# Patient Record
Sex: FEMALE | Race: WHITE | NOT HISPANIC OR LATINO | Employment: OTHER | URBAN - NONMETROPOLITAN AREA
[De-identification: names, ages, dates, MRNs, and addresses within clinical notes are randomized per-mention and may not be internally consistent; named-entity substitution may affect disease eponyms.]

---

## 2022-05-23 ENCOUNTER — OFFICE VISIT (OUTPATIENT)
Dept: FAMILY MEDICINE CLINIC | Facility: CLINIC | Age: 72
End: 2022-05-23

## 2022-05-23 VITALS
HEIGHT: 64 IN | DIASTOLIC BLOOD PRESSURE: 80 MMHG | BODY MASS INDEX: 25.1 KG/M2 | SYSTOLIC BLOOD PRESSURE: 144 MMHG | WEIGHT: 147 LBS | OXYGEN SATURATION: 98 % | HEART RATE: 83 BPM

## 2022-05-23 DIAGNOSIS — R03.0 ELEVATED BLOOD PRESSURE READING: ICD-10-CM

## 2022-05-23 DIAGNOSIS — E03.9 ACQUIRED HYPOTHYROIDISM: Primary | ICD-10-CM

## 2022-05-23 PROCEDURE — 36415 COLL VENOUS BLD VENIPUNCTURE: CPT | Performed by: PHYSICIAN ASSISTANT

## 2022-05-23 RX ORDER — LEVOTHYROXINE SODIUM 100 MCG
1 TABLET ORAL DAILY
COMMUNITY
Start: 2022-02-25 | End: 2022-05-23 | Stop reason: SDUPTHER

## 2022-05-23 RX ORDER — LEVOTHYROXINE SODIUM 0.1 MG/1
TABLET ORAL
Qty: 90 TABLET | Refills: 1 | Status: SHIPPED | OUTPATIENT
Start: 2022-05-23 | End: 2022-11-01 | Stop reason: SDUPTHER

## 2022-05-23 NOTE — ASSESSMENT & PLAN NOTE
Patient denies any missed doses we will check level today.  Switch patient to levothyroxine rather than Synthroid as requested today.

## 2022-05-23 NOTE — ASSESSMENT & PLAN NOTE
Patient reports normal blood pressure readings at home states she checks it almost daily and it remains below 120/80.

## 2022-05-23 NOTE — PROGRESS NOTES
"Chief Complaint  Hypothyroidism    Subjective          Latia Liz presents to [facility]  Patient reports today for medication refills.  States she has hypothyroidism and has been taking name brand Synthroid for about 6 months.  States that the medication is expensive and she does not notice a difference from levothyroxine.  She would like to switch back.  Patient denies any missed doses.    Patient reports she is due for health maintenance however declines at this time secondary to taking care of a family member.  Discussed risk associated with postponing and she acknowledged understanding      Objective   Vital Signs:  /80 (BP Location: Left arm, Patient Position: Sitting, Cuff Size: Adult)   Pulse 83   Ht 162.6 cm (64\")   Wt 66.7 kg (147 lb)   SpO2 98%   BMI 25.23 kg/m²         Physical Exam  Vitals and nursing note reviewed.   Constitutional:       General: She is not in acute distress.     Appearance: Normal appearance.   HENT:      Head: Normocephalic.      Right Ear: Hearing normal.      Left Ear: Hearing normal.   Eyes:      Pupils: Pupils are equal, round, and reactive to light.   Cardiovascular:      Rate and Rhythm: Normal rate and regular rhythm.   Pulmonary:      Effort: Pulmonary effort is normal. No respiratory distress.   Skin:     General: Skin is warm and dry.   Neurological:      Mental Status: She is alert.   Psychiatric:         Mood and Affect: Mood normal.        Result Review :                 Assessment and Plan    Diagnoses and all orders for this visit:    1. Acquired hypothyroidism (Primary)  Assessment & Plan:  Patient denies any missed doses we will check level today.  Switch patient to levothyroxine rather than Synthroid as requested today.    Orders:  -     levothyroxine (Synthroid) 100 MCG tablet; Take 1 tab po daily for thyroid  Dispense: 90 tablet; Refill: 1  -     TSH; Future    2. Elevated blood pressure reading  Assessment & " Plan:  Patient reports normal blood pressure readings at home states she checks it almost daily and it remains below 120/80.               Follow Up   No follow-ups on file.  Patient was given instructions and counseling regarding her condition or for health maintenance advice. Please see specific information pulled into the AVS if appropriate.

## 2022-05-24 LAB — TSH SERPL DL<=0.005 MIU/L-ACNC: 2.57 UIU/ML (ref 0.45–4.5)

## 2022-05-27 NOTE — PROGRESS NOTES
Please call notify patient her lab results were okay normal thyroid so continue current treatment plan

## 2022-09-16 ENCOUNTER — TELEPHONE (OUTPATIENT)
Dept: FAMILY MEDICINE CLINIC | Facility: CLINIC | Age: 72
End: 2022-09-16

## 2022-09-16 DIAGNOSIS — E03.9 HYPOTHYROIDISM (ACQUIRED): Primary | ICD-10-CM

## 2022-09-16 DIAGNOSIS — R73.9 HYPERGLYCEMIA: ICD-10-CM

## 2022-09-16 NOTE — TELEPHONE ENCOUNTER
Patient is requesting lab orders to be entered into Epic.  She has an upcoming appt 11/01/2022.  Once orders are entered into the system, patient would like to be contacted to schedule her lab visit. Ph: [phone]

## 2022-09-16 NOTE — TELEPHONE ENCOUNTER
Order in chart for routine fasting blood work including thyroid studies.    Please notify patient the blood work order was put in the chart.    If she has additional issues going on that might require additional blood work this will not be done until she has her appointment.

## 2022-10-24 ENCOUNTER — LAB (OUTPATIENT)
Dept: FAMILY MEDICINE CLINIC | Facility: CLINIC | Age: 72
End: 2022-10-24

## 2022-10-24 DIAGNOSIS — E03.9 HYPOTHYROIDISM (ACQUIRED): ICD-10-CM

## 2022-10-24 DIAGNOSIS — R73.9 HYPERGLYCEMIA: ICD-10-CM

## 2022-10-24 PROCEDURE — 36415 COLL VENOUS BLD VENIPUNCTURE: CPT | Performed by: FAMILY MEDICINE

## 2022-10-25 LAB
ALBUMIN SERPL-MCNC: 4.6 G/DL (ref 3.8–4.8)
ALBUMIN/GLOB SERPL: 1.9 {RATIO} (ref 1.2–2.2)
ALP SERPL-CCNC: 87 IU/L (ref 44–121)
ALT SERPL-CCNC: 13 IU/L (ref 0–32)
AST SERPL-CCNC: 20 IU/L (ref 0–40)
BASOPHILS # BLD AUTO: 0.1 X10E3/UL (ref 0–0.2)
BASOPHILS NFR BLD AUTO: 1 %
BILIRUB SERPL-MCNC: 0.5 MG/DL (ref 0–1.2)
BUN SERPL-MCNC: 12 MG/DL (ref 8–27)
BUN/CREAT SERPL: 14 (ref 12–28)
CALCIUM SERPL-MCNC: 9.7 MG/DL (ref 8.7–10.3)
CHLORIDE SERPL-SCNC: 105 MMOL/L (ref 96–106)
CHOLEST SERPL-MCNC: 256 MG/DL (ref 100–199)
CO2 SERPL-SCNC: 22 MMOL/L (ref 20–29)
CREAT SERPL-MCNC: 0.87 MG/DL (ref 0.57–1)
EGFRCR SERPLBLD CKD-EPI 2021: 72 ML/MIN/1.73
EOSINOPHIL # BLD AUTO: 0 X10E3/UL (ref 0–0.4)
EOSINOPHIL NFR BLD AUTO: 0 %
ERYTHROCYTE [DISTWIDTH] IN BLOOD BY AUTOMATED COUNT: 13.3 % (ref 11.7–15.4)
GLOBULIN SER CALC-MCNC: 2.4 G/DL (ref 1.5–4.5)
GLUCOSE SERPL-MCNC: 98 MG/DL (ref 70–99)
HBA1C MFR BLD: 5.4 % (ref 4.8–5.6)
HCT VFR BLD AUTO: 48.8 % (ref 34–46.6)
HDLC SERPL-MCNC: 56 MG/DL
HGB BLD-MCNC: 16.9 G/DL (ref 11.1–15.9)
IMM GRANULOCYTES # BLD AUTO: 0 X10E3/UL (ref 0–0.1)
IMM GRANULOCYTES NFR BLD AUTO: 1 %
LDLC SERPL CALC-MCNC: 179 MG/DL (ref 0–99)
LYMPHOCYTES # BLD AUTO: 1.2 X10E3/UL (ref 0.7–3.1)
LYMPHOCYTES NFR BLD AUTO: 15 %
MCH RBC QN AUTO: 32 PG (ref 26.6–33)
MCHC RBC AUTO-ENTMCNC: 34.6 G/DL (ref 31.5–35.7)
MCV RBC AUTO: 92 FL (ref 79–97)
MONOCYTES # BLD AUTO: 0.4 X10E3/UL (ref 0.1–0.9)
MONOCYTES NFR BLD AUTO: 6 %
NEUTROPHILS # BLD AUTO: 5.9 X10E3/UL (ref 1.4–7)
NEUTROPHILS NFR BLD AUTO: 77 %
PLATELET # BLD AUTO: 169 X10E3/UL (ref 150–450)
POTASSIUM SERPL-SCNC: 4.5 MMOL/L (ref 3.5–5.2)
PROT SERPL-MCNC: 7 G/DL (ref 6–8.5)
RBC # BLD AUTO: 5.28 X10E6/UL (ref 3.77–5.28)
SODIUM SERPL-SCNC: 143 MMOL/L (ref 134–144)
T4 FREE SERPL-MCNC: 2.05 NG/DL (ref 0.82–1.77)
TRIGL SERPL-MCNC: 118 MG/DL (ref 0–149)
TSH SERPL DL<=0.005 MIU/L-ACNC: 2.98 UIU/ML (ref 0.45–4.5)
VLDLC SERPL CALC-MCNC: 21 MG/DL (ref 5–40)
WBC # BLD AUTO: 7.7 X10E3/UL (ref 3.4–10.8)

## 2022-11-01 ENCOUNTER — OFFICE VISIT (OUTPATIENT)
Dept: FAMILY MEDICINE CLINIC | Facility: CLINIC | Age: 72
End: 2022-11-01

## 2022-11-01 VITALS
OXYGEN SATURATION: 98 % | HEART RATE: 85 BPM | DIASTOLIC BLOOD PRESSURE: 82 MMHG | SYSTOLIC BLOOD PRESSURE: 124 MMHG | HEIGHT: 64 IN | BODY MASS INDEX: 26.46 KG/M2 | WEIGHT: 155 LBS

## 2022-11-01 DIAGNOSIS — Z12.2 ENCOUNTER FOR SCREENING FOR LUNG CANCER: ICD-10-CM

## 2022-11-01 DIAGNOSIS — Z09 ENCOUNTER FOR FOLLOW-UP EXAMINATION AFTER COMPLETED TREATMENT FOR CONDITIONS OTHER THAN MALIGNANT NEOPLASM: ICD-10-CM

## 2022-11-01 DIAGNOSIS — Z12.11 SCREENING FOR COLON CANCER: ICD-10-CM

## 2022-11-01 DIAGNOSIS — Z12.31 SCREENING MAMMOGRAM FOR BREAST CANCER: ICD-10-CM

## 2022-11-01 DIAGNOSIS — E78.2 HYPERLIPIDEMIA, MIXED: ICD-10-CM

## 2022-11-01 DIAGNOSIS — E03.9 ACQUIRED HYPOTHYROIDISM: ICD-10-CM

## 2022-11-01 DIAGNOSIS — Z13.820 SCREENING FOR OSTEOPOROSIS: ICD-10-CM

## 2022-11-01 DIAGNOSIS — D75.1 POLYCYTHEMIA: ICD-10-CM

## 2022-11-01 DIAGNOSIS — Z00.00 GENERAL MEDICAL EXAM: Primary | ICD-10-CM

## 2022-11-01 DIAGNOSIS — Z87.891 PERSONAL HISTORY OF TOBACCO USE: ICD-10-CM

## 2022-11-01 PROCEDURE — G0439 PPPS, SUBSEQ VISIT: HCPCS | Performed by: FAMILY MEDICINE

## 2022-11-01 PROCEDURE — 99397 PER PM REEVAL EST PAT 65+ YR: CPT | Performed by: FAMILY MEDICINE

## 2022-11-01 PROCEDURE — 1159F MED LIST DOCD IN RCRD: CPT | Performed by: FAMILY MEDICINE

## 2022-11-01 PROCEDURE — 96160 PT-FOCUSED HLTH RISK ASSMT: CPT | Performed by: FAMILY MEDICINE

## 2022-11-01 PROCEDURE — 1170F FXNL STATUS ASSESSED: CPT | Performed by: FAMILY MEDICINE

## 2022-11-01 RX ORDER — LEVOTHYROXINE SODIUM 0.1 MG/1
TABLET ORAL
Qty: 90 TABLET | Refills: 1 | Status: SHIPPED | OUTPATIENT
Start: 2022-11-01

## 2022-11-01 RX ORDER — ROSUVASTATIN CALCIUM 5 MG/1
5 TABLET, COATED ORAL DAILY
Qty: 90 TABLET | Refills: 1 | Status: SHIPPED | OUTPATIENT
Start: 2022-11-01

## 2022-11-01 NOTE — ASSESSMENT & PLAN NOTE
We will start low-dose Crestor.  She had a lot of side effects with other medications.  Recheck on fasting lipids and repeat thyroid studies.

## 2022-11-01 NOTE — PROGRESS NOTES
QUICK REFERENCE INFORMATION:  The ABCs of the Annual Wellness Visit    Subsequent Medicare Wellness Visit    @[email or web address]@    HEALTH RISK ASSESSMENT    1950    Recent Hospitalizations:  No hospitalization(s) within the last year..        Current Medical Providers:  Patient Care Team:  Osmin Mays MD as PCP - General (Family Medicine)        Smoking Status:  Social History     Tobacco Use   Smoking Status Heavy Smoker   • Packs/day: 2.00   • Years: 42.00   • Pack years: 84.00   • Types: Cigarettes   Smokeless Tobacco Never       Alcohol Consumption:  Social History     Substance and Sexual Activity   Alcohol Use Never       Depression Screen:   PHQ-2/PHQ-9 Depression Screening 11/1/2022   Little Interest or Pleasure in Doing Things 0-->not at all   Feeling Down, Depressed or Hopeless 0-->not at all   PHQ-9: Brief Depression Severity Measure Score 0       Health Habits and Functional and Cognitive Screening:  Functional & Cognitive Status 11/1/2022   Do you have difficulty preparing food and eating? No   Do you have difficulty bathing yourself, getting dressed or grooming yourself? No   Do you have difficulty using the toilet? No   Do you have difficulty moving around from place to place? No   Do you have trouble with steps or getting out of a bed or a chair? No   Current Diet Well Balanced Diet   Dental Exam Not up to date   Eye Exam Not up to date   Exercise (times per week) 5 times per week   Current Exercises Include Yard Work   Do you need help using the phone?  No   Are you deaf or do you have serious difficulty hearing?  No   Do you need help with transportation? No   Do you need help shopping? No   Do you need help preparing meals?  No   Do you need help with housework?  No   Do you need help with laundry? No   Do you need help taking your medications? No   Do you need help managing money? No   Do you ever drive or ride in a car without wearing a seat belt? No   Have you felt unusual stress, anger  or loneliness in the last month? No   Who do you live with? Spouse   If you need help, do you have trouble finding someone available to you? No   Have you been bothered in the last four weeks by sexual problems? No   Do you have difficulty concentrating, remembering or making decisions? No       Fall Risk Screen:  MIRNA Fall Risk Assessment was completed, and patient is at LOW risk for falls.Assessment completed on:11/1/2022    ACE III MINI        Does the patient have evidence of cognitive impairment? No    Aspirin use counseling: Does not need ASA (and currently is not on it)    Recent Lab Results:  CMP:  Lab Results   Component Value Date    BUN 12 10/24/2022    CREATININE 0.87 10/24/2022    BCR 14 10/24/2022     10/24/2022    K 4.5 10/24/2022    CO2 22 10/24/2022    CALCIUM 9.7 10/24/2022    PROTENTOTREF 7.0 10/24/2022    ALBUMIN 4.6 10/24/2022    LABGLOBREF 2.4 10/24/2022    LABIL2 1.9 10/24/2022    BILITOT 0.5 10/24/2022    ALKPHOS 87 10/24/2022    AST 20 10/24/2022    ALT 13 10/24/2022     HbA1c:  Lab Results   Component Value Date    HGBA1C 5.4 10/24/2022     Microalbumin:  No results found for: MICROALBUR, POCMALB, POCCREAT  Lipid Panel  Lab Results   Component Value Date    TRIG 118 10/24/2022    HDL 56 10/24/2022     (H) 10/24/2022    AST 20 10/24/2022    ALT 13 10/24/2022       Visual Acuity:  No results found.    Age-appropriate Screening Schedule:  Refer to the list below for future screening recommendations based on patient's age, sex and/or medical conditions. Orders for these recommended tests are listed in the plan section. The patient has been provided with a written plan.    Health Maintenance   Topic Date Due   • DXA SCAN  Never done   • MAMMOGRAM  11/16/2012   • LIPID PANEL  10/24/2023   • INFLUENZA VACCINE  Discontinued   • TDAP/TD VACCINES  Discontinued   • ZOSTER VACCINE  Discontinued        Subjective   History of Present Illness    Latia Liz is a 71 y.o. female who  presents for a Subsequent Wellness Visit.    She did have fasting blood work done and is here to review the results.    She continues to smoke and is planning to consider nicotine replacement to help her quit.  She did fail the patches in the past due to nightmares.  She is willing to get set up for low-dose lung CT.    Declines all vaccination updates-reviewed flu, Prevnar 20, Tdap, Shingrix, and COVID today.    Declines colonoscopy but is willing to try Cologuard again.    Past due for mammogram and DEXA and is willing to get these scheduled.    Blood pressure checks at home of been doing well but usually runs higher here.    Lipids did return significantly elevated with her last labs and she is willing to try the lowest dose on Crestor but had side effects with other medications.    Thyroid blood work returned with normal TSH but mild free T4 elevation.  We will continue her current dosing and get a recheck on thyroid studies.    Mild polycythemia with labs.  We discussed further work-up options but this is likely related to her smoking.  She voiced understanding but wants to wait on further work-up at this time.    CHRONIC CONDITIONS    The following portions of the patient's history were reviewed and updated as appropriate: allergies, current medications, past family history, past medical history, past social history, past surgical history and problem list.    Outpatient Medications Prior to Visit   Medication Sig Dispense Refill   • levothyroxine (Synthroid) 100 MCG tablet Take 1 tab po daily for thyroid 90 tablet 1     No facility-administered medications prior to visit.       Patient Active Problem List   Diagnosis   • Acquired hypothyroidism   • Elevated blood pressure reading   • General medical exam   • Screening mammogram for breast cancer   • Screening for osteoporosis   • Screening for colon cancer   • Personal history of tobacco use   • Encounter for screening for lung cancer   • Encounter for  follow-up examination after completed treatment for conditions other than malignant neoplasm   • Hyperlipidemia, mixed   • Polycythemia       Advance Care Planning:  ACP discussion was held with the patient during this visit. Patient does not have an advance directive, information provided.    Identification of Risk Factors:  Risk factors include: Advance Directive Discussion  Breast Cancer/Mammogram Screening  Colon Cancer Screening  Immunizations Discussed/Encouraged (specific immunizations; Tdap, Influenza, Prevnar 20 (Pneumococcal 20-valent conjugate), Shingrix and COVID19 )  Lung Cancer Risk  Osteoporosis Risk.    Review of Systems   Constitutional: Negative for appetite change, chills, fever and unexpected weight change.   HENT: Negative for hearing loss.    Eyes: Negative for visual disturbance.   Respiratory: Negative for chest tightness, shortness of breath and wheezing.    Cardiovascular: Negative for chest pain, palpitations and leg swelling.   Gastrointestinal: Negative for abdominal pain.   Musculoskeletal: Negative for arthralgias, back pain and gait problem.   Skin: Negative for rash.   Neurological: Negative for dizziness and headaches.   Psychiatric/Behavioral: Negative for agitation and confusion. The patient is not nervous/anxious.         Increase stressors at home with her 's medical problems.  He was recently diagnosed with A. fib       Compared to one year ago, the patient feels her physical health is the same.  Compared to one year ago, the patient feels her mental health is the same.    Objective     Physical Exam  Constitutional:       Appearance: Normal appearance.   HENT:      Head: Normocephalic.      Right Ear: External ear normal.      Left Ear: External ear normal.      Nose: Nose normal.   Eyes:      Pupils: Pupils are equal, round, and reactive to light.   Cardiovascular:      Rate and Rhythm: Normal rate and regular rhythm.      Heart sounds: Normal heart sounds.  "  Pulmonary:      Effort: Pulmonary effort is normal.      Breath sounds: Normal breath sounds.   Musculoskeletal:         General: Normal range of motion.      Cervical back: Normal range of motion.   Skin:     General: Skin is warm and dry.   Neurological:      General: No focal deficit present.      Mental Status: She is alert.   Psychiatric:         Mood and Affect: Mood normal.         Behavior: Behavior normal.         Thought Content: Thought content normal.          Procedures     Vitals:    11/01/22 1510 11/01/22 1520   BP: 140/90 124/82   BP Location: Left arm    Patient Position: Sitting    Cuff Size: Adult    Pulse: 85    SpO2: 98%    Weight: 70.3 kg (155 lb)    Height: 162.6 cm (64\")        BMI is >= 25 and <30. (Overweight) The following options were offered after discussion;: exercise counseling/recommendations and nutrition counseling/recommendations      Lab Results   Component Value Date    WBC 7.7 10/24/2022    HGB 16.9 (H) 10/24/2022    HCT 48.8 (H) 10/24/2022    MCV 92 10/24/2022     10/24/2022     Lab Results   Component Value Date    GLUCOSE 98 10/24/2022    BUN 12 10/24/2022    CREATININE 0.87 10/24/2022    BCR 14 10/24/2022    K 4.5 10/24/2022    CO2 22 10/24/2022    CALCIUM 9.7 10/24/2022    PROTENTOTREF 7.0 10/24/2022    ALBUMIN 4.6 10/24/2022    LABIL2 1.9 10/24/2022    AST 20 10/24/2022    ALT 13 10/24/2022     Lab Results   Component Value Date    TSH 2.980 10/24/2022     No results found for: PSA  Lab Results   Component Value Date    CHLPL 256 (H) 10/24/2022    TRIG 118 10/24/2022    HDL 56 10/24/2022     (H) 10/24/2022       Assessment & Plan   Problem List Items Addressed This Visit        Cardiac and Vasculature    Hyperlipidemia, mixed    Current Assessment & Plan     We will start low-dose Crestor.  She had a lot of side effects with other medications.  Recheck on fasting lipids and repeat thyroid studies.         Relevant Medications    rosuvastatin (Crestor) 5 MG " tablet       Endocrine and Metabolic    Acquired hypothyroidism    Relevant Medications    levothyroxine (Synthroid) 100 MCG tablet       Gastrointestinal Abdominal     Screening for colon cancer    Relevant Orders    Cologuard - Stool, Per Rectum       Health Encounters    General medical exam - Primary    Current Assessment & Plan     Reviewed together health maintenance, screening, and vaccination options at her annual wellness visit and physical today.    She voiced understanding and is being set up for mammogram, DEXA, Cologuard, and low-dose lung CT.    Declines all vaccination update.    Labs reviewed and we will start low-dose Crestor.    Smoking cessation advised discussed nicotine replacement options.         Encounter for follow-up examination after completed treatment for conditions other than malignant neoplasm    Relevant Orders    DEXA Bone Density Axial       Hematology and Neoplasia    Polycythemia    Current Assessment & Plan     See above.    Likely related to smoking.    Working on quitting smoking and we will monitor this with future labs.            Musculoskeletal and Injuries    Screening for osteoporosis    Relevant Orders    DEXA Bone Density Axial       Pulmonary and Pneumonias    Encounter for screening for lung cancer    Relevant Orders     CT Chest Low Dose Cancer Screening WO       Tobacco    Personal history of tobacco use    Relevant Orders     CT Chest Low Dose Cancer Screening WO       Other    Screening mammogram for breast cancer    Relevant Orders    Mammo Screening Bilateral With CAD     Patient Self-Management and Personalized Health Advice  The patient has been provided with information about: exercise and weight management and preventive services including:   · Annual Wellness Visit (AWV)  · Colorectal Cancer Screening, Cologuard Test   · Hepatitis C Virus Screening (beneficiaries must fall into one of the following categories to be eligible- high risk for HCV infection,  born between 8518-[birth year]256, or history of blood transfusion before 1992)  · Lung Cancer Screening Counseling and Annual Screening for Lung Cancer with Low Dose Computed Tomography (LDCT); (use of smartset for low dose CT for lung cancer screening for documentation and orders).    Outpatient Encounter Medications as of 11/1/2022   Medication Sig Dispense Refill   • levothyroxine (Synthroid) 100 MCG tablet Take 1 tab po daily for thyroid 90 tablet 1   • [DISCONTINUED] levothyroxine (Synthroid) 100 MCG tablet Take 1 tab po daily for thyroid 90 tablet 1   • rosuvastatin (Crestor) 5 MG tablet Take 1 tablet by mouth Daily. 90 tablet 1     No facility-administered encounter medications on file as of 11/1/2022.       Reviewed use of high risk medication in the elderly: yes  Reviewed for potential of harmful drug interactions in the elderly: yes    Diagnoses and all orders for this visit:    1. General medical exam (Primary)  Assessment & Plan:  Reviewed together health maintenance, screening, and vaccination options at her annual wellness visit and physical today.    She voiced understanding and is being set up for mammogram, DEXA, Cologuard, and low-dose lung CT.    Declines all vaccination update.    Labs reviewed and we will start low-dose Crestor.    Smoking cessation advised discussed nicotine replacement options.      2. Screening mammogram for breast cancer  -     Mammo Screening Bilateral With CAD; Future    3. Screening for osteoporosis  -     DEXA Bone Density Axial; Future    4. Screening for colon cancer  -     Cologuard - Stool, Per Rectum; Future    5. Personal history of tobacco use  -      CT Chest Low Dose Cancer Screening WO; Future    6. Encounter for screening for lung cancer  -      CT Chest Low Dose Cancer Screening WO; Future    7. Encounter for follow-up examination after completed treatment for conditions other than malignant neoplasm  -     DEXA Bone Density Axial; Future    8. Hyperlipidemia,  mixed  Assessment & Plan:  We will start low-dose Crestor.  She had a lot of side effects with other medications.  Recheck on fasting lipids and repeat thyroid studies.    Orders:  -     rosuvastatin (Crestor) 5 MG tablet; Take 1 tablet by mouth Daily.  Dispense: 90 tablet; Refill: 1    9. Acquired hypothyroidism  -     levothyroxine (Synthroid) 100 MCG tablet; Take 1 tab po daily for thyroid  Dispense: 90 tablet; Refill: 1    10. Polycythemia  Assessment & Plan:  See above.    Likely related to smoking.    Working on quitting smoking and we will monitor this with future labs.        Follow Up:  Return in about 6 months (around 5/1/2023) for Med recheck.     There are no Patient Instructions on file for this visit.    An After Visit Summary and PPPS with all of these plans were given to the patient.

## 2022-11-01 NOTE — ASSESSMENT & PLAN NOTE
See above.    Likely related to smoking.    Working on quitting smoking and we will monitor this with future labs.

## 2022-11-01 NOTE — ASSESSMENT & PLAN NOTE
Reviewed together health maintenance, screening, and vaccination options at her annual wellness visit and physical today.    She voiced understanding and is being set up for mammogram, DEXA, Cologuard, and low-dose lung CT.    Declines all vaccination update.    Labs reviewed and we will start low-dose Crestor.    Smoking cessation advised discussed nicotine replacement options.

## 2022-12-13 ENCOUNTER — APPOINTMENT (OUTPATIENT)
Dept: WOMENS IMAGING | Facility: HOSPITAL | Age: 72
End: 2022-12-13

## 2022-12-13 PROCEDURE — 77067 SCR MAMMO BI INCL CAD: CPT | Performed by: RADIOLOGY

## 2023-05-01 ENCOUNTER — OFFICE VISIT (OUTPATIENT)
Dept: FAMILY MEDICINE CLINIC | Facility: CLINIC | Age: 73
End: 2023-05-01
Payer: MEDICARE

## 2023-05-01 VITALS
SYSTOLIC BLOOD PRESSURE: 125 MMHG | HEART RATE: 89 BPM | BODY MASS INDEX: 26.98 KG/M2 | HEIGHT: 64 IN | WEIGHT: 158 LBS | DIASTOLIC BLOOD PRESSURE: 84 MMHG | OXYGEN SATURATION: 95 %

## 2023-05-01 DIAGNOSIS — Z11.59 NEED FOR HEPATITIS C SCREENING TEST: ICD-10-CM

## 2023-05-01 DIAGNOSIS — R03.0 ELEVATED BLOOD PRESSURE READING: Chronic | ICD-10-CM

## 2023-05-01 DIAGNOSIS — E03.9 ACQUIRED HYPOTHYROIDISM: Primary | ICD-10-CM

## 2023-05-01 DIAGNOSIS — E78.2 HYPERLIPIDEMIA, MIXED: ICD-10-CM

## 2023-05-01 DIAGNOSIS — J01.10 ACUTE NON-RECURRENT FRONTAL SINUSITIS: ICD-10-CM

## 2023-05-01 DIAGNOSIS — D75.1 POLYCYTHEMIA: ICD-10-CM

## 2023-05-01 DIAGNOSIS — M81.0 AGE-RELATED OSTEOPOROSIS WITHOUT CURRENT PATHOLOGICAL FRACTURE: ICD-10-CM

## 2023-05-01 PROBLEM — Z09 ENCOUNTER FOR FOLLOW-UP EXAMINATION AFTER COMPLETED TREATMENT FOR CONDITIONS OTHER THAN MALIGNANT NEOPLASM: Status: RESOLVED | Noted: 2022-11-01 | Resolved: 2023-05-01

## 2023-05-01 PROBLEM — Z13.820 SCREENING FOR OSTEOPOROSIS: Status: RESOLVED | Noted: 2022-11-01 | Resolved: 2023-05-01

## 2023-05-01 RX ORDER — LEVOTHYROXINE SODIUM 0.1 MG/1
TABLET ORAL
Qty: 90 TABLET | Refills: 1 | Status: SHIPPED | OUTPATIENT
Start: 2023-05-01 | End: 2023-05-02 | Stop reason: SDUPTHER

## 2023-05-01 RX ORDER — ROSUVASTATIN CALCIUM 5 MG/1
5 TABLET, COATED ORAL DAILY
Qty: 90 TABLET | Refills: 1 | Status: SHIPPED | OUTPATIENT
Start: 2023-05-01

## 2023-05-01 RX ORDER — CEFUROXIME AXETIL 500 MG/1
500 TABLET ORAL 2 TIMES DAILY
Qty: 20 TABLET | Refills: 0 | Status: SHIPPED | OUTPATIENT
Start: 2023-05-01 | End: 2023-05-11

## 2023-05-01 NOTE — ASSESSMENT & PLAN NOTE
Home checks have been good but they do tend to be a little higher here.  We will continue to monitor.  Smoking cessation advised.  No medication is needed at this time

## 2023-05-01 NOTE — ASSESSMENT & PLAN NOTE
Given duration of worsening symptoms and sinus tenderness on exam will treat with Ceftin for sinusitis.  Smoking cessation advised.  We also discussed and recommended ongoing Flonase use

## 2023-05-01 NOTE — PROGRESS NOTES
"Chief Complaint  Allergies and Med Refill    Subjective    History of Present Illness:  Latia Liz is a 72 y.o. female who presents today for 6-month follow-up visit medication refills.    She has been doing well since our last visit for Medicare annual wellness and physical exam in November.    She is having increased frontal sinus pain and pressure and this has worsened over the past few weeks.  She does understand that ongoing smoking does increase her risk for bacterial sinusitis.    Continues on Synthroid for hypothyroidism and Crestor for hyperlipidemia.    She continues to smoke and is planning to consider nicotine replacement to help her quit.  She did fail the patches in the past due to nightmares.  Low-dose lung CT up-to-date December 2022.     Declines all vaccination updates-reviewed flu, Prevnar 20, Tdap, Shingrix, and COVID today.     Declines colonoscopy and we did order Cologuard but she has not yet completed it.    Mammogram up-to-date December 2022.  Mammogram returned normal and DEXA did show osteoporosis.  She is willing to get vitamin D checked but does not want any other treatment at this time.    Mild polycythemia with past labs.  We discussed further work-up options but this is likely related to her smoking.  She voiced understanding but wants to wait on further work-up at this time.    Objective   Vital Signs:   /84   Pulse 89   Ht 162.6 cm (64\")   Wt 71.7 kg (158 lb)   SpO2 95%   BMI 27.12 kg/m²     Review of Systems   Constitutional: Negative for appetite change, chills and fever.   HENT: Positive for sinus pressure. Negative for hearing loss.         Worsening frontal sinus pain and pressure.  See HPI   Eyes: Negative for blurred vision.   Respiratory: Negative for chest tightness.    Cardiovascular: Negative for chest pain.   Gastrointestinal: Negative for abdominal pain.   Musculoskeletal: Negative for gait problem.   Skin: Negative for rash.   Psychiatric/Behavioral: " Negative for depressed mood.       Past History:  Medical History: has a past medical history of Hypercholesterolemia and Thyroiditis.   Surgical History: has no past surgical history on file.   Family History: family history is not on file.   Social History: reports that she has been smoking cigarettes. She has a 84.00 pack-year smoking history. She has never used smokeless tobacco. She reports that she does not drink alcohol and does not use drugs.      Current Outpatient Medications:   •  levothyroxine (Synthroid) 100 MCG tablet, Take 1 tab po daily for thyroid, Disp: 90 tablet, Rfl: 1  •  rosuvastatin (Crestor) 5 MG tablet, Take 1 tablet by mouth Daily., Disp: 90 tablet, Rfl: 1  •  cefuroxime (CEFTIN) 500 MG tablet, Take 1 tablet by mouth 2 (Two) Times a Day for 10 days., Disp: 20 tablet, Rfl: 0    Allergies: Patient has no known allergies.    Physical Exam  Constitutional:       Appearance: Normal appearance.   HENT:      Head: Normocephalic.      Comments: Frontal sinus tenderness     Right Ear: Tympanic membrane, ear canal and external ear normal.      Left Ear: Tympanic membrane, ear canal and external ear normal.      Nose: Nose normal.   Eyes:      Pupils: Pupils are equal, round, and reactive to light.   Cardiovascular:      Rate and Rhythm: Normal rate and regular rhythm.      Heart sounds: Normal heart sounds.   Pulmonary:      Effort: Pulmonary effort is normal.      Breath sounds: Normal breath sounds.   Musculoskeletal:         General: Normal range of motion.      Cervical back: Normal range of motion.   Skin:     General: Skin is warm and dry.   Neurological:      General: No focal deficit present.      Mental Status: She is alert.   Psychiatric:         Mood and Affect: Mood normal.         Behavior: Behavior normal.         Thought Content: Thought content normal.          Result Review                   Assessment and Plan  Diagnoses and all orders for this visit:    1. Acquired hypothyroidism  (Primary)  Assessment & Plan:  Continue Synthroid    Orders:  -     levothyroxine (Synthroid) 100 MCG tablet; Take 1 tab po daily for thyroid  Dispense: 90 tablet; Refill: 1  -     TSH; Future  -     T4, Free; Future  -     TSH  -     T4, Free    2. Hyperlipidemia, mixed  Assessment & Plan:  Discussed Crestor and she has had some side effects so has not been taking this regularly but will try and adding co-Q10 to reduce side effect risk with low-dose Crestor.    Smoking cessation also encouraged.    Orders:  -     rosuvastatin (Crestor) 5 MG tablet; Take 1 tablet by mouth Daily.  Dispense: 90 tablet; Refill: 1  -     Comprehensive Metabolic Panel; Future  -     Lipid Panel; Future  -     TSH; Future  -     T4, Free; Future  -     Comprehensive Metabolic Panel  -     Lipid Panel  -     TSH  -     T4, Free    3. Polycythemia  Assessment & Plan:  Awaiting recheck on CBC.  Discussed concerns with polycythemia and suspect this is related to secondary polycythemia due to her ongoing smoking.  She would like to hold off on further work-up other than recheck on CBC    Orders:  -     CBC Auto Differential; Future  -     CBC Auto Differential    4. Age-related osteoporosis without current pathological fracture  Assessment & Plan:  Discussed osteoporosis and osteopenia with her most recent bone density evaluation in December.  She voiced understanding but declines medications for osteoporosis.  She is willing to get a check on her vitamin D with labs today.    Orders:  -     Vitamin D,25-Hydroxy; Future  -     Vitamin D,25-Hydroxy    5. Need for hepatitis C screening test  -     HCV Antibody Rfx To Qnt PCR; Future  -     HCV Antibody Rfx To Qnt PCR    6. Elevated blood pressure reading  Assessment & Plan:  Home checks have been good but they do tend to be a little higher here.  We will continue to monitor.  Smoking cessation advised.  No medication is needed at this time      7. Acute non-recurrent frontal  sinusitis  Assessment & Plan:  Given duration of worsening symptoms and sinus tenderness on exam will treat with Ceftin for sinusitis.  Smoking cessation advised.  We also discussed and recommended ongoing Flonase use    Orders:  -     cefuroxime (CEFTIN) 500 MG tablet; Take 1 tablet by mouth 2 (Two) Times a Day for 10 days.  Dispense: 20 tablet; Refill: 0      BMI is >= 25 and <30. (Overweight) The following options were offered after discussion;: exercise counseling/recommendations and nutrition counseling/recommendations          Follow Up  Return in 6 months (on 11/2/2023) for Medicare Wellness, Annual physical, Fasting for labs at appointment (but drink water!).    Osmin Mays MD

## 2023-05-01 NOTE — ASSESSMENT & PLAN NOTE
Discussed osteoporosis and osteopenia with her most recent bone density evaluation in December.  She voiced understanding but declines medications for osteoporosis.  She is willing to get a check on her vitamin D with labs today.

## 2023-05-01 NOTE — ASSESSMENT & PLAN NOTE
Awaiting recheck on CBC.  Discussed concerns with polycythemia and suspect this is related to secondary polycythemia due to her ongoing smoking.  She would like to hold off on further work-up other than recheck on CBC

## 2023-05-01 NOTE — ASSESSMENT & PLAN NOTE
Discussed Crestor and she has had some side effects so has not been taking this regularly but will try and adding co-Q10 to reduce side effect risk with low-dose Crestor.    Smoking cessation also encouraged.

## 2023-05-02 DIAGNOSIS — M81.0 AGE-RELATED OSTEOPOROSIS WITHOUT CURRENT PATHOLOGICAL FRACTURE: Primary | ICD-10-CM

## 2023-05-02 DIAGNOSIS — E55.9 VITAMIN D DEFICIENCY: ICD-10-CM

## 2023-05-02 DIAGNOSIS — E03.9 ACQUIRED HYPOTHYROIDISM: ICD-10-CM

## 2023-05-02 LAB
25(OH)D3+25(OH)D2 SERPL-MCNC: 22.6 NG/ML (ref 30–100)
ALBUMIN SERPL-MCNC: 4.8 G/DL (ref 3.8–4.8)
ALBUMIN/GLOB SERPL: 2.1 {RATIO} (ref 1.2–2.2)
ALP SERPL-CCNC: 84 IU/L (ref 44–121)
ALT SERPL-CCNC: 21 IU/L (ref 0–32)
AST SERPL-CCNC: 26 IU/L (ref 0–40)
BASOPHILS # BLD AUTO: 0.1 X10E3/UL (ref 0–0.2)
BASOPHILS NFR BLD AUTO: 2 %
BILIRUB SERPL-MCNC: 0.3 MG/DL (ref 0–1.2)
BUN SERPL-MCNC: 10 MG/DL (ref 8–27)
BUN/CREAT SERPL: 11 (ref 12–28)
CALCIUM SERPL-MCNC: 9.6 MG/DL (ref 8.7–10.3)
CHLORIDE SERPL-SCNC: 107 MMOL/L (ref 96–106)
CHOLEST SERPL-MCNC: 192 MG/DL (ref 100–199)
CO2 SERPL-SCNC: 22 MMOL/L (ref 20–29)
CREAT SERPL-MCNC: 0.88 MG/DL (ref 0.57–1)
EGFRCR SERPLBLD CKD-EPI 2021: 71 ML/MIN/1.73
EOSINOPHIL # BLD AUTO: 0 X10E3/UL (ref 0–0.4)
EOSINOPHIL NFR BLD AUTO: 0 %
ERYTHROCYTE [DISTWIDTH] IN BLOOD BY AUTOMATED COUNT: 13.5 % (ref 11.7–15.4)
GLOBULIN SER CALC-MCNC: 2.3 G/DL (ref 1.5–4.5)
GLUCOSE SERPL-MCNC: 106 MG/DL (ref 70–99)
HCT VFR BLD AUTO: 47.7 % (ref 34–46.6)
HCV AB SERPL QL IA: NORMAL
HCV IGG SERPL QL IA: NON REACTIVE
HDLC SERPL-MCNC: 59 MG/DL
HGB BLD-MCNC: 16.3 G/DL (ref 11.1–15.9)
IMM GRANULOCYTES # BLD AUTO: 0 X10E3/UL (ref 0–0.1)
IMM GRANULOCYTES NFR BLD AUTO: 0 %
LDLC SERPL CALC-MCNC: 118 MG/DL (ref 0–99)
LYMPHOCYTES # BLD AUTO: 1.6 X10E3/UL (ref 0.7–3.1)
LYMPHOCYTES NFR BLD AUTO: 22 %
MCH RBC QN AUTO: 30.8 PG (ref 26.6–33)
MCHC RBC AUTO-ENTMCNC: 34.2 G/DL (ref 31.5–35.7)
MCV RBC AUTO: 90 FL (ref 79–97)
MONOCYTES # BLD AUTO: 0.5 X10E3/UL (ref 0.1–0.9)
MONOCYTES NFR BLD AUTO: 8 %
NEUTROPHILS # BLD AUTO: 4.8 X10E3/UL (ref 1.4–7)
NEUTROPHILS NFR BLD AUTO: 68 %
PLATELET # BLD AUTO: 220 X10E3/UL (ref 150–450)
POTASSIUM SERPL-SCNC: 4.6 MMOL/L (ref 3.5–5.2)
PROT SERPL-MCNC: 7.1 G/DL (ref 6–8.5)
RBC # BLD AUTO: 5.3 X10E6/UL (ref 3.77–5.28)
SODIUM SERPL-SCNC: 144 MMOL/L (ref 134–144)
T4 FREE SERPL-MCNC: 2.17 NG/DL (ref 0.82–1.77)
TRIGL SERPL-MCNC: 81 MG/DL (ref 0–149)
TSH SERPL DL<=0.005 MIU/L-ACNC: 2.14 UIU/ML (ref 0.45–4.5)
VLDLC SERPL CALC-MCNC: 15 MG/DL (ref 5–40)
WBC # BLD AUTO: 7.1 X10E3/UL (ref 3.4–10.8)

## 2023-05-02 RX ORDER — LEVOTHYROXINE SODIUM 88 UG/1
88 TABLET ORAL
Qty: 90 TABLET | Refills: 1 | Status: SHIPPED | OUTPATIENT
Start: 2023-05-02

## 2023-10-17 ENCOUNTER — TELEPHONE (OUTPATIENT)
Dept: FAMILY MEDICINE CLINIC | Facility: CLINIC | Age: 73
End: 2023-10-17
Payer: MEDICARE

## 2023-10-17 DIAGNOSIS — E03.9 ACQUIRED HYPOTHYROIDISM: ICD-10-CM

## 2023-10-17 RX ORDER — LEVOTHYROXINE SODIUM 88 UG/1
88 TABLET ORAL
Qty: 30 TABLET | Refills: 0 | Status: SHIPPED | OUTPATIENT
Start: 2023-10-17

## 2023-10-17 NOTE — TELEPHONE ENCOUNTER
Caller: Latia Liz    Relationship: Self    Best call back number: [phone]     Requested Prescriptions:          levothyroxine (Synthroid) 88 MCG tablet       [facility] [address] [phone] [phone] [facility]     Last office visit with prescribing clinician: 5/1/2023   Last telemedicine visit with prescribing clinician: Visit date not found   Next office visit with prescribing clinician: 11/10/2023     Additional details provided by patient: PATIENT REQUESTING 30 DAY SUPPLY TO GET HER THROUGH UNTIL HER UPCOMING APPOINTMENT.    Does the patient have less than a 3 day supply:  [x] Yes  [] No    Would you like a call back once the refill request has been completed: [x] Yes [] No    If the office needs to give you a call back, can they leave a voicemail: [x] Yes [] No    Mary Jo Echols Rep   10/17/23 09:19 EDT

## 2023-11-10 ENCOUNTER — OFFICE VISIT (OUTPATIENT)
Dept: FAMILY MEDICINE CLINIC | Facility: CLINIC | Age: 73
End: 2023-11-10
Payer: MEDICARE

## 2023-11-10 VITALS
SYSTOLIC BLOOD PRESSURE: 162 MMHG | HEIGHT: 64 IN | RESPIRATION RATE: 16 BRPM | OXYGEN SATURATION: 98 % | DIASTOLIC BLOOD PRESSURE: 82 MMHG | WEIGHT: 156 LBS | BODY MASS INDEX: 26.63 KG/M2 | HEART RATE: 68 BPM

## 2023-11-10 DIAGNOSIS — Z12.2 ENCOUNTER FOR SCREENING FOR LUNG CANCER: ICD-10-CM

## 2023-11-10 DIAGNOSIS — R09.81 NASAL CONGESTION: ICD-10-CM

## 2023-11-10 DIAGNOSIS — M81.0 AGE-RELATED OSTEOPOROSIS WITHOUT CURRENT PATHOLOGICAL FRACTURE: Chronic | ICD-10-CM

## 2023-11-10 DIAGNOSIS — E66.3 OVERWEIGHT (BMI 25.0-29.9): ICD-10-CM

## 2023-11-10 DIAGNOSIS — R73.9 HYPERGLYCEMIA: ICD-10-CM

## 2023-11-10 DIAGNOSIS — Z87.891 PERSONAL HISTORY OF TOBACCO USE: ICD-10-CM

## 2023-11-10 DIAGNOSIS — I10 HYPERTENSION, ESSENTIAL: ICD-10-CM

## 2023-11-10 DIAGNOSIS — E78.2 HYPERLIPIDEMIA, MIXED: Chronic | ICD-10-CM

## 2023-11-10 DIAGNOSIS — E55.9 VITAMIN D DEFICIENCY: ICD-10-CM

## 2023-11-10 DIAGNOSIS — E03.9 ACQUIRED HYPOTHYROIDISM: Chronic | ICD-10-CM

## 2023-11-10 DIAGNOSIS — Z00.00 GENERAL MEDICAL EXAM: Primary | ICD-10-CM

## 2023-11-10 DIAGNOSIS — D75.1 POLYCYTHEMIA: Chronic | ICD-10-CM

## 2023-11-10 DIAGNOSIS — Z12.11 SCREENING FOR COLON CANCER: ICD-10-CM

## 2023-11-10 DIAGNOSIS — Z12.31 SCREENING MAMMOGRAM FOR BREAST CANCER: ICD-10-CM

## 2023-11-10 PROBLEM — J01.10 ACUTE NON-RECURRENT FRONTAL SINUSITIS: Status: RESOLVED | Noted: 2023-05-01 | Resolved: 2023-11-10

## 2023-11-10 PROBLEM — Z11.59 NEED FOR HEPATITIS C SCREENING TEST: Status: RESOLVED | Noted: 2023-05-01 | Resolved: 2023-11-10

## 2023-11-10 RX ORDER — AMLODIPINE BESYLATE 5 MG/1
5 TABLET ORAL DAILY
Qty: 90 TABLET | Refills: 1 | Status: SHIPPED | OUTPATIENT
Start: 2023-11-10

## 2023-11-10 RX ORDER — LEVOTHYROXINE SODIUM 88 UG/1
88 TABLET ORAL
Qty: 90 TABLET | Refills: 1 | Status: SHIPPED | OUTPATIENT
Start: 2023-11-10

## 2023-11-10 RX ORDER — EZETIMIBE 10 MG/1
10 TABLET ORAL DAILY
Qty: 30 TABLET | Refills: 5 | Status: SHIPPED | OUTPATIENT
Start: 2023-11-10

## 2023-11-10 RX ORDER — AZELASTINE 1 MG/ML
2 SPRAY, METERED NASAL 2 TIMES DAILY
Qty: 30 ML | Refills: 5 | Status: SHIPPED | OUTPATIENT
Start: 2023-11-10

## 2023-11-10 NOTE — ASSESSMENT & PLAN NOTE
Declines colonoscopy    Past positive cologuard 9/2021 reviewed - she continues to decline further workup and understands risks and concerns for colon cancer.

## 2023-11-10 NOTE — ASSESSMENT & PLAN NOTE
Hypertension is worsening.  Regular aerobic exercise.  Stop smoking.  Medication changes per orders.  Blood pressure will be reassessed at the next regular appointment.    Discussed blood pressure elevation at home and with today's visit.  Started on low-dose Norvasc.  Side effects and expectations reviewed.  She will restart home blood pressure checks and let us know if she is staying over 140/90 for medication change considerations

## 2023-11-10 NOTE — ASSESSMENT & PLAN NOTE
Discussed concerns with polycythemia and suspect this is related to secondary polycythemia due to her ongoing smoking.  She would like to hold off on further work-up other than recheck on CBC

## 2023-11-10 NOTE — ASSESSMENT & PLAN NOTE
Home checks have been good but they do tend to be a little higher here.  We will continue to monitor.  Smoking cessation advised.

## 2023-11-10 NOTE — ASSESSMENT & PLAN NOTE
Discussed together side effects with statins and she is willing to try nonstatin medication.    We did continue to encourage smoking cessation and she declines.    Given trial with Zetia.

## 2023-11-10 NOTE — ASSESSMENT & PLAN NOTE
Patient's (Body mass index is 26.78 kg/m².) indicates that they are overweight with health conditions that include hypertension and dyslipidemias . Weight is unchanged. BMI is is above average; BMI management plan is completed. We discussed portion control and increasing exercise.

## 2023-11-10 NOTE — ASSESSMENT & PLAN NOTE
Discussed osteoporosis and osteopenia with her most recent bone density evaluation in December 2022.  She voiced understanding but declines medications for osteoporosis.  She is willing to get a check on her vitamin D with labs today.

## 2023-11-10 NOTE — ASSESSMENT & PLAN NOTE
Discussed together health maintenance and screening along with vaccination options and healthy diet and exercise habits as part of the preventative counseling at their physical exam today.     She voiced understanding and is being set up for mammogram and low-dose lung CT.    Declines all vaccination update.        Smoking cessation advised discussed nicotine replacement options.   35yo  @ 394d here for IOL 2/2 GDMA1.  GBS neg,  EFW 7lb leopold.

## 2023-11-10 NOTE — PROGRESS NOTES
QUICK REFERENCE INFORMATION:  The ABCs of the Annual Wellness Visit    Subsequent Medicare Wellness Visit    @[email or web address]@    HEALTH RISK ASSESSMENT    1950    Recent Hospitalizations:  No hospitalization(s) within the last year..        Current Medical Providers:  Patient Care Team:  Osmin Mays MD as PCP - General (Family Medicine)        Smoking Status:  Social History     Tobacco Use   Smoking Status Heavy Smoker    Packs/day: 2.00    Years: 42.00    Additional pack years: 0.00    Total pack years: 84.00    Types: Cigarettes    Start date:     Passive exposure: Current   Smokeless Tobacco Never       Alcohol Consumption:  Social History     Substance and Sexual Activity   Alcohol Use Never       Depression Screen:       11/10/2023     8:24 AM   PHQ-2/PHQ-9 Depression Screening   Little Interest or Pleasure in Doing Things 0-->not at all   Feeling Down, Depressed or Hopeless 0-->not at all   PHQ-9: Brief Depression Severity Measure Score 0       Health Habits and Functional and Cognitive Screenin/10/2023     8:24 AM   Functional & Cognitive Status   Do you have difficulty preparing food and eating? No   Do you have difficulty bathing yourself, getting dressed or grooming yourself? No   Do you have difficulty using the toilet? No   Do you have difficulty moving around from place to place? No   Do you have trouble with steps or getting out of a bed or a chair? No   Current Diet Limited Junk Food   Dental Exam Up to date   Eye Exam Up to date   Exercise (times per week) 3 times per week   Current Exercises Include Yard Work   Do you need help using the phone?  No   Are you deaf or do you have serious difficulty hearing?  No   Do you need help to go to places out of walking distance? No   Do you need help shopping? No   Do you need help preparing meals?  No   Do you need help with housework?  No   Do you need help with laundry? No   Do you need help taking your medications? No   Do you need  "help managing money? No   Do you ever drive or ride in a car without wearing a seat belt? No   Have you felt unusual stress, anger or loneliness in the last month? Yes   Who do you live with? Spouse   If you need help, do you have trouble finding someone available to you? No   Have you been bothered in the last four weeks by sexual problems? No   Do you have difficulty concentrating, remembering or making decisions? No       Fall Risk Screen:  MIRNA Fall Risk Assessment was completed, and patient is at LOW risk for falls.Assessment completed on:11/10/2023    ACE III MINI        Does the patient have evidence of cognitive impairment? No    Aspirin use counseling: Does not need ASA (and currently is not on it)    Recent Lab Results:  CMP:  Lab Results   Component Value Date    BUN 10 05/01/2023    CREATININE 0.88 05/01/2023    BCR 11 (L) 05/01/2023     05/01/2023    K 4.6 05/01/2023    CO2 22 05/01/2023    CALCIUM 9.6 05/01/2023    PROTENTOTREF 7.1 05/01/2023    ALBUMIN 4.8 05/01/2023    LABGLOBREF 2.3 05/01/2023    LABIL2 2.1 05/01/2023    BILITOT 0.3 05/01/2023    ALKPHOS 84 05/01/2023    AST 26 05/01/2023    ALT 21 05/01/2023     HbA1c:  Lab Results   Component Value Date    HGBA1C 5.4 10/24/2022     Microalbumin:  No results found for: \"MICROALBUR\", \"POCMALB\", \"POCCREAT\"  Lipid Panel  Lab Results   Component Value Date    TRIG 81 05/01/2023    HDL 59 05/01/2023     (H) 05/01/2023    AST 26 05/01/2023    ALT 21 05/01/2023       Visual Acuity:  No results found.    Age-appropriate Screening Schedule:  Refer to the list below for future screening recommendations based on patient's age, sex and/or medical conditions. Orders for these recommended tests are listed in the plan section. The patient has been provided with a written plan.    Health Maintenance   Topic Date Due    LUNG CANCER SCREENING  12/13/2023    LIPID PANEL  05/01/2024    ANNUAL WELLNESS VISIT  11/10/2024    BMI FOLLOWUP  11/10/2024    " MAMMOGRAM  12/13/2024    DXA SCAN  12/13/2024    COLORECTAL CANCER SCREENING  05/09/2025    HEPATITIS C SCREENING  Completed    COVID-19 Vaccine  Discontinued    INFLUENZA VACCINE  Discontinued    Pneumococcal Vaccine 65+  Discontinued    TDAP/TD VACCINES  Discontinued    ZOSTER VACCINE  Discontinued        Subjective   History of Present Illness    Latia Liz is a 72 y.o. female who presents for a Subsequent Wellness Visit and physical exam.    She is additionally here for 6 month followup visit for medication refills and to get fasting labwork uptodate.     Doing well with synthroid for hypothyroidism, off crestor for hyperlipidemia given side-effects - willing to try non-statin with zetia, and vit D given history of vit D deficiency and osteoporosis.    She continues to smoke and is planning to consider nicotine replacement to help her quit.  She did fail the patches in the past due to nightmares.  Low-dose lung CT up-to-date December 2022.  Ready to get scheduled for Dec 2023.      Declines all vaccination updates-reviewed flu, Prevnar 20, Tdap, Shingrix, and COVID today.     Declines colonoscopy.  Cologuard pos 9/2021 with [facility].  No bleeding or wt loss or stool changes.  Declines colonoscopy even in context of pos cologuard.      Mammogram up-to-date December 2022.  Mammogram returned normal and DEXA did show osteoporosis.  She declines osteoporosis treatment but does continue with vit D.     Mild polycythemia with past labs.  We discussed further work-up options but this is likely related to her smoking.  She voiced understanding but wants to wait on further work-up at this time.    Blood pressure has been good with home checks but recently with increased stress due to her 's diagnosis of prostate cancer and A-fib her home readings have been higher.  Her reading this morning was 130/92 and today she was 162/82.  She has been reluctant to start medications for hypertension but is  ready to start on something low-dose.    Recurrent problems with nasal congestion and sinusitis and she has been using Flonase.  Interested in adding Astelin.      CHRONIC CONDITIONS    The following portions of the patient's history were reviewed and updated as appropriate: allergies, current medications, past family history, past medical history, past social history, past surgical history, and problem list.    Outpatient Medications Prior to Visit   Medication Sig Dispense Refill    levothyroxine (Synthroid) 88 MCG tablet Take 1 tablet by mouth Every Morning. (Note dose reduction based on labs) 30 tablet 0    vitamin D3 125 MCG (5000 UT) capsule capsule Take 1 capsule by mouth Daily. 90 capsule 1    rosuvastatin (Crestor) 5 MG tablet Take 1 tablet by mouth Daily. (Patient not taking: Reported on 11/10/2023) 90 tablet 1     No facility-administered medications prior to visit.       Patient Active Problem List   Diagnosis    Acquired hypothyroidism    Elevated blood pressure reading    General medical exam    Screening mammogram for breast cancer    Screening for colon cancer    Personal history of tobacco use    Encounter for screening for lung cancer    Hyperlipidemia, mixed    Polycythemia    Age-related osteoporosis without current pathological fracture    Overweight (BMI 25.0-29.9)    Nasal congestion    Hypertension, essential       Advance Care Planning:  ACP discussion was held with the patient during this visit. Patient does not have an advance directive, information provided.    Identification of Risk Factors:  Risk factors include: Advance Directive Discussion  Breast Cancer/Mammogram Screening  Colon Cancer Screening  Fall Risk  Immunizations Discussed/Encouraged (specific immunizations; Tdap, Influenza, Prevnar 20 (Pneumococcal 20-valent conjugate), Shingrix, COVID19, and RSV (Respiratory Syncytial Virus) )  Lung Cancer Risk.    Review of Systems   Constitutional:  Negative for appetite change, chills,  "fever and unexpected weight change.   HENT:  Positive for congestion. Negative for hearing loss.    Eyes:  Negative for visual disturbance.   Respiratory:  Negative for chest tightness, shortness of breath and wheezing.    Cardiovascular:  Negative for chest pain, palpitations and leg swelling.   Gastrointestinal:  Negative for abdominal pain.   Musculoskeletal:  Negative for arthralgias, back pain and gait problem.   Skin:  Negative for rash.   Neurological:  Negative for dizziness and headaches.   Psychiatric/Behavioral:  Negative for agitation and confusion. The patient is not nervous/anxious.         See HPI.  Increased stress with her 's recent health problems       Compared to one year ago, the patient feels her physical health is the same.  Compared to one year ago, the patient feels her mental health is the same.    Objective     Physical Exam  Constitutional:       Appearance: Normal appearance.   HENT:      Head: Normocephalic.      Right Ear: External ear normal.      Left Ear: External ear normal.      Nose: Nose normal. Congestion present.   Eyes:      Pupils: Pupils are equal, round, and reactive to light.   Cardiovascular:      Rate and Rhythm: Normal rate and regular rhythm.      Heart sounds: Normal heart sounds.   Pulmonary:      Effort: Pulmonary effort is normal.      Breath sounds: Normal breath sounds.   Musculoskeletal:         General: Normal range of motion.      Cervical back: Normal range of motion.   Skin:     General: Skin is warm and dry.   Neurological:      General: No focal deficit present.      Mental Status: She is alert.   Psychiatric:         Mood and Affect: Mood normal.         Behavior: Behavior normal.         Thought Content: Thought content normal.          Procedures     Vitals:    11/10/23 0823   BP: 162/82   BP Location: Left arm   Patient Position: Sitting   Cuff Size: Adult   Pulse: 68   Resp: 16   SpO2: 98%   Weight: 70.8 kg (156 lb)   Height: 162.6 cm (64\") " "  PainSc: 0-No pain              Lab Results   Component Value Date    WBC 7.1 05/01/2023    HGB 16.3 (H) 05/01/2023    HCT 47.7 (H) 05/01/2023    MCV 90 05/01/2023     05/01/2023     Lab Results   Component Value Date    GLUCOSE 106 (H) 05/01/2023    BUN 10 05/01/2023    CREATININE 0.88 05/01/2023    BCR 11 (L) 05/01/2023    K 4.6 05/01/2023    CO2 22 05/01/2023    CALCIUM 9.6 05/01/2023    PROTENTOTREF 7.1 05/01/2023    ALBUMIN 4.8 05/01/2023    LABIL2 2.1 05/01/2023    AST 26 05/01/2023    ALT 21 05/01/2023     Lab Results   Component Value Date    TSH 2.140 05/01/2023     No results found for: \"PSA\"  Lab Results   Component Value Date    CHLPL 192 05/01/2023    TRIG 81 05/01/2023    HDL 59 05/01/2023     (H) 05/01/2023       Assessment & Plan   Problem List Items Addressed This Visit       Acquired hypothyroidism (Chronic)    Current Assessment & Plan     Continue Synthroid         Relevant Medications    levothyroxine (Synthroid) 88 MCG tablet    Other Relevant Orders    CBC Auto Differential    Comprehensive Metabolic Panel    Lipid Panel    TSH    T4, Free    General medical exam - Primary    Current Assessment & Plan     Discussed together health maintenance and screening along with vaccination options and healthy diet and exercise habits as part of the preventative counseling at their physical exam today.     She voiced understanding and is being set up for mammogram and low-dose lung CT.    Declines all vaccination update.        Smoking cessation advised discussed nicotine replacement options.         Screening mammogram for breast cancer    Relevant Orders    Mammo Screening Bilateral With CAD    Screening for colon cancer    Current Assessment & Plan     Declines colonoscopy    Past positive cologuard 9/2021 reviewed - she continues to decline further workup and understands risks and concerns for colon cancer.          Personal history of tobacco use    Relevant Orders     CT Chest Low " Dose Cancer Screening WO    Encounter for screening for lung cancer    Relevant Orders     CT Chest Low Dose Cancer Screening WO    Hyperlipidemia, mixed (Chronic)    Current Assessment & Plan     Discussed together side effects with statins and she is willing to try nonstatin medication.    We did continue to encourage smoking cessation and she declines.    Given trial with Zetia.         Relevant Medications    ezetimibe (Zetia) 10 MG tablet    Other Relevant Orders    CBC Auto Differential    Comprehensive Metabolic Panel    Lipid Panel    TSH    T4, Free    Polycythemia (Chronic)    Current Assessment & Plan     Discussed concerns with polycythemia and suspect this is related to secondary polycythemia due to her ongoing smoking.  She would like to hold off on further work-up other than recheck on CBC         Relevant Orders    CBC Auto Differential    Age-related osteoporosis without current pathological fracture (Chronic)    Current Assessment & Plan     Discussed osteoporosis and osteopenia with her most recent bone density evaluation in December 2022.  She voiced understanding but declines medications for osteoporosis.  She is willing to get a check on her vitamin D with labs today.         Relevant Medications    vitamin D3 125 MCG (5000 UT) capsule capsule    Other Relevant Orders    Vitamin D,25-Hydroxy    Overweight (BMI 25.0-29.9)    Current Assessment & Plan     Patient's (Body mass index is 26.78 kg/m².) indicates that they are overweight with health conditions that include hypertension and dyslipidemias . Weight is unchanged. BMI is is above average; BMI management plan is completed. We discussed portion control and increasing exercise.          Nasal congestion    Current Assessment & Plan     Given trial of Astelin.         Relevant Medications    azelastine (ASTELIN) 0.1 % nasal spray    Hypertension, essential    Current Assessment & Plan     Hypertension is worsening.  Regular aerobic  exercise.  Stop smoking.  Medication changes per orders.  Blood pressure will be reassessed at the next regular appointment.    Discussed blood pressure elevation at home and with today's visit.  Started on low-dose Norvasc.  Side effects and expectations reviewed.  She will restart home blood pressure checks and let us know if she is staying over 140/90 for medication change considerations         Relevant Medications    amLODIPine (NORVASC) 5 MG tablet     Other Visit Diagnoses       Hyperglycemia        Relevant Orders    Hemoglobin A1c    Vitamin D deficiency        Relevant Medications    vitamin D3 125 MCG (5000 UT) capsule capsule    Other Relevant Orders    Vitamin D,25-Hydroxy          Patient Self-Management and Personalized Health Advice  The patient has been provided with information about: diet, exercise, and weight management and preventive services including:   Annual Wellness Visit (AWV)  Lung Cancer Screening Counseling and Annual Screening for Lung Cancer with Low Dose Computed Tomography (LDCT); (use of smartset for low dose CT for lung cancer screening for documentation and orders)  Screening Mammography .    Outpatient Encounter Medications as of 11/10/2023   Medication Sig Dispense Refill    levothyroxine (Synthroid) 88 MCG tablet Take 1 tablet by mouth Every Morning. 90 tablet 1    vitamin D3 125 MCG (5000 UT) capsule capsule Take 1 capsule by mouth Daily. 90 capsule 1    [DISCONTINUED] levothyroxine (Synthroid) 88 MCG tablet Take 1 tablet by mouth Every Morning. (Note dose reduction based on labs) 30 tablet 0    [DISCONTINUED] vitamin D3 125 MCG (5000 UT) capsule capsule Take 1 capsule by mouth Daily. 90 capsule 1    amLODIPine (NORVASC) 5 MG tablet Take 1 tablet by mouth Daily. For blood pressure 90 tablet 1    azelastine (ASTELIN) 0.1 % nasal spray 2 sprays into the nostril(s) as directed by provider 2 (Two) Times a Day. Use in each nostril as directed 30 mL 5    ezetimibe (Zetia) 10 MG  tablet Take 1 tablet by mouth Daily. For cholesterol (replaces crestor) 30 tablet 5    [DISCONTINUED] rosuvastatin (Crestor) 5 MG tablet Take 1 tablet by mouth Daily. (Patient not taking: Reported on 11/10/2023) 90 tablet 1     No facility-administered encounter medications on file as of 11/10/2023.       Reviewed use of high risk medication in the elderly: yes  Reviewed for potential of harmful drug interactions in the elderly: yes    Diagnoses and all orders for this visit:    1. General medical exam (Primary)  Assessment & Plan:  Discussed together health maintenance and screening along with vaccination options and healthy diet and exercise habits as part of the preventative counseling at their physical exam today.     She voiced understanding and is being set up for mammogram and low-dose lung CT.    Declines all vaccination update.        Smoking cessation advised discussed nicotine replacement options.      2. Screening mammogram for breast cancer  -     Mammo Screening Bilateral With CAD; Future    3. Screening for colon cancer  Assessment & Plan:  Declines colonoscopy    Past positive cologuard 9/2021 reviewed - she continues to decline further workup and understands risks and concerns for colon cancer.       4. Personal history of tobacco use  -      CT Chest Low Dose Cancer Screening WO; Future    5. Encounter for screening for lung cancer  -      CT Chest Low Dose Cancer Screening WO; Future    6. Hyperlipidemia, mixed  Assessment & Plan:  Discussed together side effects with statins and she is willing to try nonstatin medication.    We did continue to encourage smoking cessation and she declines.    Given trial with Zetia.    Orders:  -     CBC Auto Differential; Future  -     Comprehensive Metabolic Panel; Future  -     Lipid Panel; Future  -     TSH; Future  -     T4, Free; Future  -     ezetimibe (Zetia) 10 MG tablet; Take 1 tablet by mouth Daily. For cholesterol (replaces crestor)  Dispense: 30  tablet; Refill: 5    7. Acquired hypothyroidism  Assessment & Plan:  Continue Synthroid    Orders:  -     CBC Auto Differential; Future  -     Comprehensive Metabolic Panel; Future  -     Lipid Panel; Future  -     TSH; Future  -     T4, Free; Future  -     levothyroxine (Synthroid) 88 MCG tablet; Take 1 tablet by mouth Every Morning.  Dispense: 90 tablet; Refill: 1    8. Polycythemia  Assessment & Plan:  Discussed concerns with polycythemia and suspect this is related to secondary polycythemia due to her ongoing smoking.  She would like to hold off on further work-up other than recheck on CBC    Orders:  -     CBC Auto Differential; Future    9. Age-related osteoporosis without current pathological fracture  Assessment & Plan:  Discussed osteoporosis and osteopenia with her most recent bone density evaluation in December 2022.  She voiced understanding but declines medications for osteoporosis.  She is willing to get a check on her vitamin D with labs today.    Orders:  -     Vitamin D,25-Hydroxy; Future  -     vitamin D3 125 MCG (5000 UT) capsule capsule; Take 1 capsule by mouth Daily.  Dispense: 90 capsule; Refill: 1    10. Hyperglycemia  -     Hemoglobin A1c; Future    11. Vitamin D deficiency  -     Vitamin D,25-Hydroxy; Future  -     vitamin D3 125 MCG (5000 UT) capsule capsule; Take 1 capsule by mouth Daily.  Dispense: 90 capsule; Refill: 1    12. Overweight (BMI 25.0-29.9)  Assessment & Plan:  Patient's (Body mass index is 26.78 kg/m².) indicates that they are overweight with health conditions that include hypertension and dyslipidemias . Weight is unchanged. BMI is is above average; BMI management plan is completed. We discussed portion control and increasing exercise.       13. Nasal congestion  Assessment & Plan:  Given trial of Astelin.    Orders:  -     azelastine (ASTELIN) 0.1 % nasal spray; 2 sprays into the nostril(s) as directed by provider 2 (Two) Times a Day. Use in each nostril as directed   Dispense: 30 mL; Refill: 5    14. Hypertension, essential  Assessment & Plan:  Hypertension is worsening.  Regular aerobic exercise.  Stop smoking.  Medication changes per orders.  Blood pressure will be reassessed at the next regular appointment.    Discussed blood pressure elevation at home and with today's visit.  Started on low-dose Norvasc.  Side effects and expectations reviewed.  She will restart home blood pressure checks and let us know if she is staying over 140/90 for medication change considerations    Orders:  -     amLODIPine (NORVASC) 5 MG tablet; Take 1 tablet by mouth Daily. For blood pressure  Dispense: 90 tablet; Refill: 1         Follow Up:  Return in about 6 months (around 5/10/2024) for Med recheck, Fasting for labs at appointment (but drink water!).     There are no Patient Instructions on file for this visit.    An After Visit Summary and PPPS with all of these plans were given to the patient.

## 2023-11-11 LAB
25(OH)D3+25(OH)D2 SERPL-MCNC: 16.8 NG/ML (ref 30–100)
ALBUMIN SERPL-MCNC: 4.3 G/DL (ref 3.9–4.9)
ALBUMIN/GLOB SERPL: 1.6 {RATIO} (ref 1.2–2.2)
ALP SERPL-CCNC: 96 IU/L (ref 44–121)
ALT SERPL-CCNC: 16 IU/L (ref 0–32)
AST SERPL-CCNC: 21 IU/L (ref 0–40)
BASOPHILS # BLD AUTO: 0.1 X10E3/UL (ref 0–0.2)
BASOPHILS NFR BLD AUTO: 1 %
BILIRUB SERPL-MCNC: 0.5 MG/DL (ref 0–1.2)
BUN SERPL-MCNC: 9 MG/DL (ref 8–27)
BUN/CREAT SERPL: 10 (ref 12–28)
CALCIUM SERPL-MCNC: 9.5 MG/DL (ref 8.7–10.3)
CHLORIDE SERPL-SCNC: 106 MMOL/L (ref 96–106)
CHOLEST SERPL-MCNC: 266 MG/DL (ref 100–199)
CO2 SERPL-SCNC: 23 MMOL/L (ref 20–29)
CREAT SERPL-MCNC: 0.87 MG/DL (ref 0.57–1)
EGFRCR SERPLBLD CKD-EPI 2021: 72 ML/MIN/1.73
EOSINOPHIL # BLD AUTO: 0 X10E3/UL (ref 0–0.4)
EOSINOPHIL NFR BLD AUTO: 1 %
ERYTHROCYTE [DISTWIDTH] IN BLOOD BY AUTOMATED COUNT: 13.7 % (ref 11.7–15.4)
GLOBULIN SER CALC-MCNC: 2.7 G/DL (ref 1.5–4.5)
GLUCOSE SERPL-MCNC: 91 MG/DL (ref 70–99)
HBA1C MFR BLD: 5.5 % (ref 4.8–5.6)
HCT VFR BLD AUTO: 46.1 % (ref 34–46.6)
HDLC SERPL-MCNC: 62 MG/DL
HGB BLD-MCNC: 15.6 G/DL (ref 11.1–15.9)
IMM GRANULOCYTES # BLD AUTO: 0 X10E3/UL (ref 0–0.1)
IMM GRANULOCYTES NFR BLD AUTO: 0 %
LDLC SERPL CALC-MCNC: 183 MG/DL (ref 0–99)
LYMPHOCYTES # BLD AUTO: 1.5 X10E3/UL (ref 0.7–3.1)
LYMPHOCYTES NFR BLD AUTO: 23 %
MCH RBC QN AUTO: 31.1 PG (ref 26.6–33)
MCHC RBC AUTO-ENTMCNC: 33.8 G/DL (ref 31.5–35.7)
MCV RBC AUTO: 92 FL (ref 79–97)
MONOCYTES # BLD AUTO: 0.5 X10E3/UL (ref 0.1–0.9)
MONOCYTES NFR BLD AUTO: 7 %
NEUTROPHILS # BLD AUTO: 4.3 X10E3/UL (ref 1.4–7)
NEUTROPHILS NFR BLD AUTO: 68 %
PLATELET # BLD AUTO: 177 X10E3/UL (ref 150–450)
POTASSIUM SERPL-SCNC: 4.3 MMOL/L (ref 3.5–5.2)
PROT SERPL-MCNC: 7 G/DL (ref 6–8.5)
RBC # BLD AUTO: 5.02 X10E6/UL (ref 3.77–5.28)
SODIUM SERPL-SCNC: 144 MMOL/L (ref 134–144)
T4 FREE SERPL-MCNC: 1.66 NG/DL (ref 0.82–1.77)
TRIGL SERPL-MCNC: 117 MG/DL (ref 0–149)
TSH SERPL DL<=0.005 MIU/L-ACNC: 4.6 UIU/ML (ref 0.45–4.5)
VLDLC SERPL CALC-MCNC: 21 MG/DL (ref 5–40)
WBC # BLD AUTO: 6.4 X10E3/UL (ref 3.4–10.8)

## 2024-05-09 DIAGNOSIS — E03.9 ACQUIRED HYPOTHYROIDISM: Chronic | ICD-10-CM

## 2024-05-10 RX ORDER — LEVOTHYROXINE SODIUM 88 UG/1
88 TABLET ORAL
Qty: 30 TABLET | Refills: 0 | Status: SHIPPED | OUTPATIENT
Start: 2024-05-10

## 2024-05-21 DIAGNOSIS — E78.2 HYPERLIPIDEMIA, MIXED: Chronic | ICD-10-CM

## 2024-05-21 RX ORDER — EZETIMIBE 10 MG/1
10 TABLET ORAL DAILY
Qty: 30 TABLET | Refills: 0 | Status: SHIPPED | OUTPATIENT
Start: 2024-05-21

## 2024-05-30 ENCOUNTER — OFFICE VISIT (OUTPATIENT)
Dept: FAMILY MEDICINE CLINIC | Facility: CLINIC | Age: 74
End: 2024-05-30
Payer: MEDICARE

## 2024-05-30 VITALS
SYSTOLIC BLOOD PRESSURE: 122 MMHG | HEART RATE: 94 BPM | HEIGHT: 64 IN | WEIGHT: 147.3 LBS | OXYGEN SATURATION: 97 % | DIASTOLIC BLOOD PRESSURE: 82 MMHG | BODY MASS INDEX: 25.15 KG/M2

## 2024-05-30 DIAGNOSIS — E03.9 ACQUIRED HYPOTHYROIDISM: Chronic | ICD-10-CM

## 2024-05-30 DIAGNOSIS — M81.0 AGE-RELATED OSTEOPOROSIS WITHOUT CURRENT PATHOLOGICAL FRACTURE: Chronic | ICD-10-CM

## 2024-05-30 DIAGNOSIS — I10 HYPERTENSION, ESSENTIAL: Primary | ICD-10-CM

## 2024-05-30 DIAGNOSIS — E78.2 HYPERLIPIDEMIA, MIXED: Chronic | ICD-10-CM

## 2024-05-30 DIAGNOSIS — E55.9 VITAMIN D DEFICIENCY: ICD-10-CM

## 2024-05-30 DIAGNOSIS — R73.9 HYPERGLYCEMIA: ICD-10-CM

## 2024-05-30 DIAGNOSIS — E66.3 OVERWEIGHT (BMI 25.0-29.9): ICD-10-CM

## 2024-05-30 DIAGNOSIS — D75.1 POLYCYTHEMIA: ICD-10-CM

## 2024-05-30 DIAGNOSIS — J30.1 SEASONAL ALLERGIC RHINITIS DUE TO POLLEN: ICD-10-CM

## 2024-05-30 DIAGNOSIS — R35.0 URINARY FREQUENCY: ICD-10-CM

## 2024-05-30 PROBLEM — R03.0 ELEVATED BLOOD PRESSURE READING: Chronic | Status: RESOLVED | Noted: 2022-05-23 | Resolved: 2024-05-30

## 2024-05-30 PROBLEM — R30.0 DYSURIA: Status: ACTIVE | Noted: 2024-05-30

## 2024-05-30 LAB
BILIRUB BLD-MCNC: NEGATIVE MG/DL
CLARITY, POC: CLEAR
COLOR UR: YELLOW
EXPIRATION DATE: NORMAL
GLUCOSE UR STRIP-MCNC: NEGATIVE MG/DL
KETONES UR QL: NEGATIVE
LEUKOCYTE EST, POC: NEGATIVE
Lab: NORMAL
NITRITE UR-MCNC: NEGATIVE MG/ML
PH UR: 5.5 [PH] (ref 5–8)
PROT UR STRIP-MCNC: NEGATIVE MG/DL
RBC # UR STRIP: NEGATIVE /UL
SP GR UR: 1 (ref 1–1.03)
UROBILINOGEN UR QL: NORMAL

## 2024-05-30 PROCEDURE — 1160F RVW MEDS BY RX/DR IN RCRD: CPT | Performed by: FAMILY MEDICINE

## 2024-05-30 PROCEDURE — G2211 COMPLEX E/M VISIT ADD ON: HCPCS | Performed by: FAMILY MEDICINE

## 2024-05-30 PROCEDURE — 3074F SYST BP LT 130 MM HG: CPT | Performed by: FAMILY MEDICINE

## 2024-05-30 PROCEDURE — 81003 URINALYSIS AUTO W/O SCOPE: CPT | Performed by: FAMILY MEDICINE

## 2024-05-30 PROCEDURE — 99214 OFFICE O/P EST MOD 30 MIN: CPT | Performed by: FAMILY MEDICINE

## 2024-05-30 PROCEDURE — 3079F DIAST BP 80-89 MM HG: CPT | Performed by: FAMILY MEDICINE

## 2024-05-30 PROCEDURE — 1126F AMNT PAIN NOTED NONE PRSNT: CPT | Performed by: FAMILY MEDICINE

## 2024-05-30 PROCEDURE — 1159F MED LIST DOCD IN RCRD: CPT | Performed by: FAMILY MEDICINE

## 2024-05-30 RX ORDER — IBUPROFEN 200 MG
TABLET ORAL
COMMUNITY

## 2024-05-30 RX ORDER — EZETIMIBE 10 MG/1
10 TABLET ORAL DAILY
Qty: 90 TABLET | Refills: 2 | Status: SHIPPED | OUTPATIENT
Start: 2024-05-30

## 2024-05-30 RX ORDER — LEVOTHYROXINE SODIUM 88 UG/1
88 TABLET ORAL
Qty: 90 TABLET | Refills: 2 | Status: SHIPPED | OUTPATIENT
Start: 2024-05-30 | End: 2024-05-31 | Stop reason: SDUPTHER

## 2024-05-30 RX ORDER — FLUTICASONE PROPIONATE 50 MCG
SPRAY, SUSPENSION (ML) NASAL DAILY
COMMUNITY
End: 2024-05-30 | Stop reason: SDUPTHER

## 2024-05-30 RX ORDER — FLUTICASONE PROPIONATE 50 MCG
2 SPRAY, SUSPENSION (ML) NASAL DAILY
Qty: 48 G | Refills: 2 | Status: SHIPPED | OUTPATIENT
Start: 2024-05-30

## 2024-05-30 NOTE — ASSESSMENT & PLAN NOTE
Patient's (Body mass index is 25.27 kg/m².) indicates that they are overweight with health conditions that include hypertension and dyslipidemias . Weight is unchanged. BMI is is above average; BMI management plan is completed. We discussed portion control and increasing exercise.

## 2024-05-30 NOTE — PROGRESS NOTES
Chief Complaint  Hyperlipidemia, allergies, hypothyroidism, vitamin D deficiency, osteoporosis, elevated blood pressure, recent urinary tract infection.    Subjective    History of Present Illness:  Latia Liz is a 73 y.o. female who presents today for 6 month followup visit for medication refills and to get fasting labwork uptodate.     She did have an episode of UTI and treated this with antibiotics at home but would like her urine checked today to make sure everything cleared.    She has noticed a little bit of frequency but no ongoing dysuria.    Doing well with synthroid for hypothyroidism, off crestor for hyperlipidemia given side-effects - willing to try non-statin with zetia and we started this at her last visit.    Continues with vit D given history of vit D deficiency and osteoporosis.  She declines treatment for osteoporosis.    She continues to smoke and is planning to consider nicotine replacement to help her quit.  She did fail the patches in the past due to nightmares.  Low-dose lung CT up-to-date December 2023     Declines all vaccination updates-reviewed flu, Prevnar 20, Tdap, RSV, Shingrix, and COVID today.     Declines colonoscopy.  Cologuard pos 9/2021 with [facility].  No bleeding or wt loss or stool changes.  Declines colonoscopy even in context of pos cologuard.      Mammogram up-to-date December 21, 2023.  DEXA did show osteoporosis.  She declines osteoporosis treatment but does continue with vit D.     Mild polycythemia with past labs.  We discussed further work-up options but we discussed this is likely related to her smoking.  She voiced understanding but wants to wait on further work-up at this time.    We started low-dose norvasc last visit but she decided to hold off given home blood pressure readings have been in the 110s to 120s over 80s.  Encouraged her to bring her home blood pressure cuff into the office to verify accuracy of follow-up.    Recurrent problems with  "nasal congestion and sinusitis and she did try Astelin without improvement and would like to stay with Flonase.      Objective   Vital Signs:   /82   Pulse 94   Ht 162.6 cm (64.02\")   Wt 66.8 kg (147 lb 4.8 oz)   SpO2 97%   BMI 25.27 kg/m²     Review of Systems   Constitutional:  Negative for appetite change, chills and fever.   HENT:  Positive for postnasal drip and rhinorrhea. Negative for hearing loss.    Eyes:  Negative for blurred vision.   Respiratory:  Negative for chest tightness.    Cardiovascular:  Negative for chest pain.   Gastrointestinal:  Negative for abdominal pain.   Genitourinary:  Positive for frequency. Negative for dysuria and hematuria.   Musculoskeletal:  Negative for gait problem.   Skin:  Negative for rash.   Psychiatric/Behavioral:  Negative for depressed mood.        Past History:  Medical History: has a past medical history of Hypercholesterolemia, Hypothyroidism (), and Thyroiditis.   Surgical History: has a past surgical history that includes  section (199060) and Eye surgery (104852).   Family History: family history includes Heart disease in her maternal grandfather.   Social History: reports that she has been smoking cigarettes. She started smoking about 43 years ago. She has a 86.8 pack-year smoking history. She has been exposed to tobacco smoke. She has never used smokeless tobacco. She reports that she does not drink alcohol and does not use drugs.      Current Outpatient Medications:     ezetimibe (ZETIA) 10 MG tablet, Take 1 tablet by mouth Daily. for cholesterol, Disp: 90 tablet, Rfl: 2    fluticasone (Flonase Allergy Relief) 50 MCG/ACT nasal spray, 2 sprays into the nostril(s) as directed by provider Daily., Disp: 48 g, Rfl: 2    ibuprofen (Advil) 200 MG tablet, Every four hours, Disp: , Rfl:     levothyroxine (Synthroid) 88 MCG tablet, Take 1 tablet by mouth Every Morning., Disp: 90 tablet, Rfl: 2    vitamin D3 125 MCG (5000 UT) capsule capsule, Take 1 " capsule by mouth Daily., Disp: 90 capsule, Rfl: 1    amLODIPine (NORVASC) 5 MG tablet, Take 1 tablet by mouth Daily. For blood pressure (Patient not taking: Reported on 5/30/2024), Disp: 90 tablet, Rfl: 1    Allergies: Crestor [rosuvastatin]    Physical Exam  Constitutional:       Appearance: Normal appearance.   HENT:      Head: Normocephalic.      Right Ear: External ear normal.      Left Ear: External ear normal.      Nose: Nose normal. Congestion present.   Eyes:      Pupils: Pupils are equal, round, and reactive to light.   Cardiovascular:      Rate and Rhythm: Normal rate and regular rhythm.      Heart sounds: Normal heart sounds.   Pulmonary:      Effort: Pulmonary effort is normal.      Comments: Coarse breath sounds bilaterally.  No wheezing  Musculoskeletal:         General: Normal range of motion.      Cervical back: Normal range of motion.   Skin:     General: Skin is warm and dry.   Neurological:      General: No focal deficit present.      Mental Status: She is alert.   Psychiatric:         Mood and Affect: Mood normal.         Behavior: Behavior normal.         Thought Content: Thought content normal.          Result Review                   Assessment and Plan  Diagnoses and all orders for this visit:    1. Hypertension, essential (Primary)  Assessment & Plan:  We did review her home blood pressure readings and her blood pressure was running higher today initially.  She will bring in her blood pressure cuff to verify accuracy at follow-up.  She does have Norvasc to start if her home blood pressure readings are staying over 140/90.    Orders:  -     Comprehensive Metabolic Panel; Future  -     Lipid Panel; Future  -     TSH; Future  -     T4, Free; Future  -     Comprehensive Metabolic Panel  -     Lipid Panel  -     TSH  -     T4, Free    2. Hyperlipidemia, mixed  Assessment & Plan:   Lipid abnormalities are improving with treatment    Plan:  Continue same medication/s without change.      Discussed  medication dosage, use, side effects, and goals of treatment in detail.    Counseled patient on lifestyle modifications to help control hyperlipidemia.     Patient Treatment Goals:   LDL goal is under 130    Followup at the next regular appointment.    Orders:  -     ezetimibe (ZETIA) 10 MG tablet; Take 1 tablet by mouth Daily. for cholesterol  Dispense: 90 tablet; Refill: 2  -     Comprehensive Metabolic Panel; Future  -     Lipid Panel; Future  -     TSH; Future  -     T4, Free; Future  -     Comprehensive Metabolic Panel  -     Lipid Panel  -     TSH  -     T4, Free    3. Acquired hypothyroidism  Assessment & Plan:  Continue Synthroid    Orders:  -     levothyroxine (Synthroid) 88 MCG tablet; Take 1 tablet by mouth Every Morning.  Dispense: 90 tablet; Refill: 2  -     Comprehensive Metabolic Panel; Future  -     Lipid Panel; Future  -     TSH; Future  -     T4, Free; Future  -     Comprehensive Metabolic Panel  -     Lipid Panel  -     TSH  -     T4, Free    4. Age-related osteoporosis without current pathological fracture  Assessment & Plan:  Discussed osteoporosis and osteopenia with her most recent bone density evaluation in December 2022.  She voiced understanding but declines medications for osteoporosis.  She is willing to get a check on her vitamin D with labs today.    Orders:  -     Vitamin D,25-Hydroxy; Future  -     Vitamin D,25-Hydroxy    5. Vitamin D deficiency  -     Vitamin D,25-Hydroxy; Future  -     Vitamin D,25-Hydroxy    6. Hyperglycemia  -     Hemoglobin A1c; Future  -     Hemoglobin A1c    7. Polycythemia  Assessment & Plan:  Improved with last labs.  We will plan to recheck CBC at follow-up      8. Seasonal allergic rhinitis due to pollen  -     fluticasone (Flonase Allergy Relief) 50 MCG/ACT nasal spray; 2 sprays into the nostril(s) as directed by provider Daily.  Dispense: 48 g; Refill: 2    9. Urinary frequency  Assessment & Plan:  Repeat urinalysis returned clear.  Follow-up culture  sent    Orders:  -     POCT urinalysis dipstick, automated  -     Urine Culture - Urine, Urine, Clean Catch; Future  -     Urine Culture - Urine, Urine, Clean Catch    10. Overweight (BMI 25.0-29.9)  Assessment & Plan:  Patient's (Body mass index is 25.27 kg/m².) indicates that they are overweight with health conditions that include hypertension and dyslipidemias . Weight is unchanged. BMI is is above average; BMI management plan is completed. We discussed portion control and increasing exercise.                      Follow Up  Return in about 5 months (around 11/11/2024) for Medicare Wellness.    Osmin Mays MD

## 2024-05-30 NOTE — ASSESSMENT & PLAN NOTE
We did review her home blood pressure readings and her blood pressure was running higher today initially.  She will bring in her blood pressure cuff to verify accuracy at follow-up.  She does have [facility] to start if her home blood pressure readings are staying over 140/90.

## 2024-05-31 DIAGNOSIS — E03.9 ACQUIRED HYPOTHYROIDISM: Chronic | ICD-10-CM

## 2024-05-31 LAB
25(OH)D3+25(OH)D2 SERPL-MCNC: 30.1 NG/ML (ref 30–100)
ALBUMIN SERPL-MCNC: 4.5 G/DL (ref 3.8–4.8)
ALBUMIN/GLOB SERPL: 1.8 {RATIO} (ref 1.2–2.2)
ALP SERPL-CCNC: 84 IU/L (ref 44–121)
ALT SERPL-CCNC: 12 IU/L (ref 0–32)
AST SERPL-CCNC: 18 IU/L (ref 0–40)
BACTERIA UR CULT: NO GROWTH
BACTERIA UR CULT: NORMAL
BILIRUB SERPL-MCNC: 0.5 MG/DL (ref 0–1.2)
BUN SERPL-MCNC: 7 MG/DL (ref 8–27)
BUN/CREAT SERPL: 8 (ref 12–28)
CALCIUM SERPL-MCNC: 9.8 MG/DL (ref 8.7–10.3)
CHLORIDE SERPL-SCNC: 105 MMOL/L (ref 96–106)
CHOLEST SERPL-MCNC: 224 MG/DL (ref 100–199)
CO2 SERPL-SCNC: 23 MMOL/L (ref 20–29)
CREAT SERPL-MCNC: 0.88 MG/DL (ref 0.57–1)
EGFRCR SERPLBLD CKD-EPI 2021: 70 ML/MIN/1.73
GLOBULIN SER CALC-MCNC: 2.5 G/DL (ref 1.5–4.5)
GLUCOSE SERPL-MCNC: 91 MG/DL (ref 70–99)
HBA1C MFR BLD: 5.4 % (ref 4.8–5.6)
HDLC SERPL-MCNC: 57 MG/DL
LDLC SERPL CALC-MCNC: 147 MG/DL (ref 0–99)
POTASSIUM SERPL-SCNC: 4.4 MMOL/L (ref 3.5–5.2)
PROT SERPL-MCNC: 7 G/DL (ref 6–8.5)
SODIUM SERPL-SCNC: 143 MMOL/L (ref 134–144)
T4 FREE SERPL-MCNC: 1.98 NG/DL (ref 0.82–1.77)
TRIGL SERPL-MCNC: 111 MG/DL (ref 0–149)
TSH SERPL DL<=0.005 MIU/L-ACNC: 5.19 UIU/ML (ref 0.45–4.5)
VLDLC SERPL CALC-MCNC: 20 MG/DL (ref 5–40)

## 2024-05-31 RX ORDER — LEVOTHYROXINE SODIUM 0.07 MG/1
75 TABLET ORAL
Qty: 90 TABLET | Refills: 2 | Status: SHIPPED | OUTPATIENT
Start: 2024-05-31

## 2024-11-12 ENCOUNTER — OFFICE VISIT (OUTPATIENT)
Dept: FAMILY MEDICINE CLINIC | Facility: CLINIC | Age: 74
End: 2024-11-12
Payer: MEDICARE

## 2024-11-12 VITALS
OXYGEN SATURATION: 98 % | DIASTOLIC BLOOD PRESSURE: 88 MMHG | HEART RATE: 82 BPM | WEIGHT: 150.1 LBS | BODY MASS INDEX: 25.62 KG/M2 | SYSTOLIC BLOOD PRESSURE: 132 MMHG | HEIGHT: 64 IN

## 2024-11-12 DIAGNOSIS — I10 HYPERTENSION, ESSENTIAL: ICD-10-CM

## 2024-11-12 DIAGNOSIS — R09.81 NASAL CONGESTION: ICD-10-CM

## 2024-11-12 DIAGNOSIS — Z12.2 ENCOUNTER FOR SCREENING FOR LUNG CANCER: ICD-10-CM

## 2024-11-12 DIAGNOSIS — D75.1 POLYCYTHEMIA: ICD-10-CM

## 2024-11-12 DIAGNOSIS — J30.1 SEASONAL ALLERGIC RHINITIS DUE TO POLLEN: ICD-10-CM

## 2024-11-12 DIAGNOSIS — Z78.0 ENCOUNTER FOR OSTEOPOROSIS SCREENING IN ASYMPTOMATIC POSTMENOPAUSAL PATIENT: ICD-10-CM

## 2024-11-12 DIAGNOSIS — E78.2 HYPERLIPIDEMIA, MIXED: ICD-10-CM

## 2024-11-12 DIAGNOSIS — E66.3 OVERWEIGHT (BMI 25.0-29.9): ICD-10-CM

## 2024-11-12 DIAGNOSIS — Z00.00 GENERAL MEDICAL EXAM: Primary | ICD-10-CM

## 2024-11-12 DIAGNOSIS — Z12.31 SCREENING MAMMOGRAM FOR BREAST CANCER: ICD-10-CM

## 2024-11-12 DIAGNOSIS — E55.9 VITAMIN D DEFICIENCY: ICD-10-CM

## 2024-11-12 DIAGNOSIS — Z13.820 ENCOUNTER FOR OSTEOPOROSIS SCREENING IN ASYMPTOMATIC POSTMENOPAUSAL PATIENT: ICD-10-CM

## 2024-11-12 DIAGNOSIS — Z12.11 SCREENING FOR COLON CANCER: ICD-10-CM

## 2024-11-12 DIAGNOSIS — R73.9 HYPERGLYCEMIA: ICD-10-CM

## 2024-11-12 DIAGNOSIS — E03.9 ACQUIRED HYPOTHYROIDISM: Chronic | ICD-10-CM

## 2024-11-12 DIAGNOSIS — Z87.891 PERSONAL HISTORY OF TOBACCO USE: ICD-10-CM

## 2024-11-12 DIAGNOSIS — M81.0 AGE-RELATED OSTEOPOROSIS WITHOUT CURRENT PATHOLOGICAL FRACTURE: Chronic | ICD-10-CM

## 2024-11-12 PROCEDURE — 99397 PER PM REEVAL EST PAT 65+ YR: CPT | Performed by: FAMILY MEDICINE

## 2024-11-12 PROCEDURE — G0439 PPPS, SUBSEQ VISIT: HCPCS | Performed by: FAMILY MEDICINE

## 2024-11-12 PROCEDURE — 1126F AMNT PAIN NOTED NONE PRSNT: CPT | Performed by: FAMILY MEDICINE

## 2024-11-12 PROCEDURE — 1159F MED LIST DOCD IN RCRD: CPT | Performed by: FAMILY MEDICINE

## 2024-11-12 PROCEDURE — 96160 PT-FOCUSED HLTH RISK ASSMT: CPT | Performed by: FAMILY MEDICINE

## 2024-11-12 PROCEDURE — 99214 OFFICE O/P EST MOD 30 MIN: CPT | Performed by: FAMILY MEDICINE

## 2024-11-12 PROCEDURE — 1160F RVW MEDS BY RX/DR IN RCRD: CPT | Performed by: FAMILY MEDICINE

## 2024-11-12 PROCEDURE — 3079F DIAST BP 80-89 MM HG: CPT | Performed by: FAMILY MEDICINE

## 2024-11-12 PROCEDURE — 3075F SYST BP GE 130 - 139MM HG: CPT | Performed by: FAMILY MEDICINE

## 2024-11-12 RX ORDER — FLUTICASONE PROPIONATE 50 MCG
2 SPRAY, SUSPENSION (ML) NASAL DAILY
Qty: 48 G | Refills: 2 | Status: SHIPPED | OUTPATIENT
Start: 2024-11-12

## 2024-11-12 RX ORDER — LEVOTHYROXINE SODIUM 75 UG/1
75 TABLET ORAL
Qty: 90 TABLET | Refills: 2 | Status: SHIPPED | OUTPATIENT
Start: 2024-11-12 | End: 2024-11-15 | Stop reason: SDUPTHER

## 2024-11-12 NOTE — ASSESSMENT & PLAN NOTE
Declines colonoscopy    Past positive cologuard 9/2021 reviewed - she continues to decline further workup and understands risks and concerns for colon cancer.     FIT testing report from September 2024 through her insurance returned negative.

## 2024-11-12 NOTE — PROGRESS NOTES
Subjective   The ABCs of the Annual Wellness Visit  Medicare Wellness Visit      Latia Liz is a 73 y.o. patient who presents for a Medicare Wellness Visit.    The following portions of the patient's history were reviewed and   updated as appropriate: allergies, current medications, past family history, past medical history, past social history, past surgical history, and problem list.    Compared to one year ago, the patient's physical   health is the same.  Compared to one year ago, the patient's mental   health is the same.    Recent Hospitalizations:  She was not admitted to the hospital during the last year.     Current Medical Providers:  Patient Care Team:  Osmin Mays MD as PCP - General (Family Medicine)    Outpatient Medications Prior to Visit   Medication Sig Dispense Refill    vitamin D3 125 MCG (5000 UT) capsule capsule Take 1 capsule by mouth Daily. 90 capsule 1    fluticasone (Flonase Allergy Relief) 50 MCG/ACT nasal spray 2 sprays into the nostril(s) as directed by provider Daily. (Patient taking differently: Administer 2 sprays into the nostril(s) as directed by provider As Needed.) 48 g 2    ibuprofen (Advil) 200 MG tablet Every four hours (Patient taking differently: Take 1 tablet by mouth As Needed.)      levothyroxine (Synthroid) 75 MCG tablet Take 1 tablet by mouth Every Morning. (Take on empty stomach with water only, no other medications or food for 30 mins). NOTE DOSE REDUCTION! 90 tablet 2    amLODIPine (NORVASC) 5 MG tablet Take 1 tablet by mouth Daily. For blood pressure (Patient not taking: Reported on 11/12/2024) 90 tablet 1    ezetimibe (ZETIA) 10 MG tablet Take 1 tablet by mouth Daily. for cholesterol (Patient not taking: Reported on 11/12/2024) 90 tablet 2     No facility-administered medications prior to visit.     No opioid medication identified on active medication list. I have reviewed chart for other potential  high risk medication/s and harmful drug interactions  "in the elderly.      Aspirin is not on active medication list.  Aspirin use is not indicated based on review of current medical condition/s. Risk of harm outweighs potential benefits.  .    Patient Active Problem List   Diagnosis    Acquired hypothyroidism    General medical exam    Screening mammogram for breast cancer    Screening for colon cancer    Personal history of tobacco use    Encounter for screening for lung cancer    Hyperlipidemia, mixed    Polycythemia    Age-related osteoporosis without current pathological fracture    Overweight (BMI 25.0-29.9)    Nasal congestion    Hypertension, essential    Dysuria    Urinary frequency     Advance Care Planning Advance Directive is not on file.  ACP discussion was held with the patient during this visit. Patient does not have an advance directive, information provided.            Objective   Vitals:    11/12/24 0940 11/12/24 0950   BP: 160/82 132/88   BP Location: Left arm    Patient Position: Sitting    Cuff Size: Adult    Pulse: 82    SpO2: 98%    Weight: 68.1 kg (150 lb 1.6 oz)    Height: 162.6 cm (64.02\")    PainSc: 0-No pain        Estimated body mass index is 25.75 kg/m² as calculated from the following:    Height as of this encounter: 162.6 cm (64.02\").    Weight as of this encounter: 68.1 kg (150 lb 1.6 oz).            Does the patient have evidence of cognitive impairment? No                                                                                                Health  Risk Assessment    Smoking Status:  Social History     Tobacco Use   Smoking Status Every Day    Current packs/day: 2.00    Average packs/day: 2.0 packs/day for 43.9 years (87.7 ttl pk-yrs)    Types: Cigarettes    Start date: 1/1/1981    Passive exposure: Current   Smokeless Tobacco Never     Alcohol Consumption:  Social History     Substance and Sexual Activity   Alcohol Use Never       Fall Risk Screen  STEADI Fall Risk Assessment was completed, and patient is at LOW risk for " falls.Assessment completed on:2024    Depression Screening   Little interest or pleasure in doing things? Not at all   Feeling down, depressed, or hopeless? Not at all   PHQ-2 Total Score 0      Health Habits and Functional and Cognitive Screenin/5/2024     8:39 AM   Functional & Cognitive Status   Do you have difficulty preparing food and eating? No    Do you have difficulty bathing yourself, getting dressed or grooming yourself? No    Do you have difficulty using the toilet? No    Do you have difficulty moving around from place to place? No    Do you have trouble with steps or getting out of a bed or a chair? No    Current Diet Well Balanced Diet    Dental Exam Not up to date    Eye Exam Up to date    Exercise (times per week) 5 times per week    Current Exercises Include Gardening;House Cleaning;Walking    Do you need help using the phone?  No    Are you deaf or do you have serious difficulty hearing?  No    Do you need help to go to places out of walking distance? No    Do you need help shopping? No    Do you need help preparing meals?  No    Do you need help with housework?  No    Do you need help with laundry? No    Do you need help taking your medications? No    Do you need help managing money? No    Do you ever drive or ride in a car without wearing a seat belt? No    Have you felt unusual stress, anger or loneliness in the last month? No    Who do you live with? Spouse    If you need help, do you have trouble finding someone available to you? No    Have you been bothered in the last four weeks by sexual problems? No    Do you have difficulty concentrating, remembering or making decisions? No        Patient-reported           Age-appropriate Screening Schedule:  Refer to the list below for future screening recommendations based on patient's age, sex and/or medical conditions. Orders for these recommended tests are listed in the plan section. The patient has been provided with a written  plan.    Health Maintenance List  Health Maintenance   Topic Date Due    DXA SCAN  12/13/2024    LUNG CANCER SCREENING  12/27/2024    LIPID PANEL  05/30/2025    COLORECTAL CANCER SCREENING  09/27/2025    ANNUAL WELLNESS VISIT  11/12/2025    BMI FOLLOWUP  11/12/2025    MAMMOGRAM  12/21/2025    HEPATITIS C SCREENING  Completed    COVID-19 Vaccine  Discontinued    INFLUENZA VACCINE  Discontinued    Pneumococcal Vaccine 65+  Discontinued    TDAP/TD VACCINES  Discontinued    ZOSTER VACCINE  Discontinued                                                                                                                                                CMS Preventative Services Quick Reference  Risk Factors Identified During Encounter  Fall Risk-High or Moderate: Discussed Fall Prevention in the home  Immunizations Discussed/Encouraged: Influenza and COVID19    The above risks/problems have been discussed with the patient.  Pertinent information has been shared with the patient in the After Visit Summary.  An After Visit Summary and PPPS were made available to the patient.    Follow Up:   Next Medicare Wellness visit to be scheduled in 1 year.         Additional E&M Note during same encounter follows:  Patient has additional, significant, and separately identifiable condition(s)/problem(s) that require work above and beyond the Medicare Wellness Visit     Chief Complaint  Hyperlipidemia, allergies, hypothyroidism, vitamin D deficiency, osteoporosis, elevated blood pressure    Subjective   HPI  Latia is also being seen today for an annual adult preventative physical exam.  and Latia is also being seen today for additional medical problem/s.  Hyperlipidemia, allergies, hypothyroidism, vitamin D deficiency, osteoporosis, elevated blood pressure    Latia Liz is a 73 y.o. female who presents today for 6 month followup visit for medication refills     She has been monitoring blood pressure readings at home given problems with past  whitecoat hypertension.  We did review her readings today as follows: Saturday blood pressure 123/81, Sunday 117/76, Monday 121/82, and Tuesday (today at home) 127/87.    She has not been on Norvasc given good home blood pressure readings.  No headache or blurry vision.  Initial blood pressure elevation improved mid visit to 132/88 today.  She declines blood pressure medication given normal blood pressure readings at home and whitecoat hypertension history.    She did try Zetia for hyperlipidemia along with cardiovascular and stroke risk reduction given her history of hyperlipidemia and longstanding history of smoking.  She did have GI side effects with Zetia so decided to stop Zetia treatment.  She also failed Crestor and atorvastatin in the past.  We did discuss injectable cholesterol medication options and she voiced understanding but declines treatment for hyperlipidemia given past side effects with statins and Zetia.  She does understand increased risk for heart disease, kidney disease, peripheral vascular disease, and stroke with untreated hyperlipidemia.    Doing well with synthroid for hypothyroidism    Continues with vit D given history of vit D deficiency and osteoporosis.  She declines treatment for osteoporosis.    She continues to smoke and is not ready to quit.  She did fail nicotine replacement in the past given nightmares.  81 pk-yrs.  Low-dose lung CT up-to-date December 2023     Declines all vaccination updates-reviewed flu, Prevnar 20, Tdap, RSV, Shingrix, and COVID today.     Declines colonoscopy.  Cologuard pos 9/2021 with [facility].  No bleeding or wt loss or stool changes.  Declines colonoscopy even in context of pos cologuard.  She did complete a negative fit test with her insurance September 2024.  Discussed limitations with FIT testing.  She voiced understanding.     Mammogram up-to-date December 21, 2023.  DEXA did show osteoporosis.  She declines osteoporosis treatment but  "does continue with vit D.  Willing to get up-to-date with mammogram and DEXA in January at [facility].    Mild polycythemia with past labs.  We discussed further work-up options but we discussed this is likely related to her smoking.  She voiced understanding but wants to wait on further work-up at this time.    Recurrent problems with nasal congestion and sinusitis improved with Flonase.        Review of Systems   Constitutional:  Negative for activity change, appetite change, chills, fatigue and fever.   HENT:  Positive for congestion. Negative for ear pain, hearing loss and trouble swallowing.    Eyes:  Negative for pain and visual disturbance.   Respiratory:  Negative for cough, chest tightness, shortness of breath and wheezing.    Cardiovascular:  Negative for chest pain, palpitations and leg swelling.   Gastrointestinal:  Negative for abdominal pain and blood in stool.   Genitourinary:  Negative for difficulty urinating.   Musculoskeletal:  Negative for arthralgias, back pain and joint swelling.   Skin:  Negative for rash.   Neurological:  Negative for dizziness, weakness and light-headedness.   Psychiatric/Behavioral:  Negative for agitation, behavioral problems, dysphoric mood and sleep disturbance.               Objective   Vital Signs:  /88   Pulse 82   Ht 162.6 cm (64.02\")   Wt 68.1 kg (150 lb 1.6 oz)   SpO2 98%   BMI 25.75 kg/m²   Physical Exam  Constitutional:       Appearance: Normal appearance.   HENT:      Head: Normocephalic.      Right Ear: Tympanic membrane, ear canal and external ear normal.      Left Ear: Tympanic membrane, ear canal and external ear normal.      Nose: Nose normal.      Mouth/Throat:      Mouth: Mucous membranes are moist.      Pharynx: Oropharynx is clear.   Eyes:      Extraocular Movements: Extraocular movements intact.      Conjunctiva/sclera: Conjunctivae normal.      Pupils: Pupils are equal, round, and reactive to light.   Cardiovascular: "      Rate and Rhythm: Normal rate and regular rhythm.      Heart sounds: Normal heart sounds.   Pulmonary:      Effort: Pulmonary effort is normal.      Breath sounds: Normal breath sounds. No wheezing.   Musculoskeletal:         General: Normal range of motion.      Cervical back: Normal range of motion. No rigidity or tenderness.   Skin:     General: Skin is warm and dry.   Neurological:      General: No focal deficit present.      Mental Status: She is alert and oriented to person, place, and time.      Motor: No weakness.      Coordination: Coordination normal.      Gait: Gait normal.   Psychiatric:         Mood and Affect: Mood normal.         Behavior: Behavior normal.         Thought Content: Thought content normal.                       Assessment and Plan       Diagnoses and all orders for this visit:    1. General medical exam (Primary)  Assessment & Plan:  Discussed together health maintenance and screening along with vaccination options and healthy diet and exercise habits as part of the preventative counseling at their physical exam today.     She voiced understanding and is being set up for mammogram, DEXA and low-dose lung CT.    Declines all vaccination update.    Smoking cessation advised-declines.    Declines lipid treatment.        2. Acquired hypothyroidism  Assessment & Plan:  Continue Synthroid    Orders:  -     levothyroxine (Synthroid) 75 MCG tablet; Take 1 tablet by mouth Every Morning. (Take on empty stomach with water only, no other medications or food for 30 mins).  Dispense: 90 tablet; Refill: 2  -     CBC & Differential; Future  -     Comprehensive Metabolic Panel; Future  -     Lipid Panel; Future  -     TSH; Future  -     T4, Free; Future  -     T4, Free  -     TSH  -     Lipid Panel  -     Comprehensive Metabolic Panel  -     CBC & Differential    3. Age-related osteoporosis without current pathological fracture  Assessment & Plan:  Discussed osteoporosis and osteopenia with her most  recent bone density evaluation in December 2022.  She voiced understanding but declines medications for osteoporosis.  She is willing to get a check on her vitamin D with labs today.      4. Vitamin D deficiency  -     Vitamin D,25-Hydroxy; Future  -     Vitamin D,25-Hydroxy    5. Hypertension, essential  Assessment & Plan:  We did review her home blood pressure readings and her blood pressure was running higher today initially.      She does have Norvasc to start if her home blood pressure readings are staying over 140/90.      6. Hyperlipidemia, mixed  Assessment & Plan:   Lipid abnormalities are  unchanged    Plan:  Lipid lowering therapy not prescribed due to previous adverse reaction    Counseled patient on lifestyle modifications to help control hyperlipidemia.     Failed treatment with Crestor and atorvastatin.  She also had side effects with Zetia.  Discussed newer injectable options.  She voiced understanding and declines all lipid treatment.    Discussed risk with untreated hyperlipidemia including cardiovascular risk, peripheral arterial disease risk, chronic kidney disease risk, and stroke risk.  She voiced understanding and declines lipid treatment    Orders:  -     CBC & Differential; Future  -     Comprehensive Metabolic Panel; Future  -     Lipid Panel; Future  -     TSH; Future  -     T4, Free; Future  -     T4, Free  -     TSH  -     Lipid Panel  -     Comprehensive Metabolic Panel  -     CBC & Differential    7. Hyperglycemia  -     Hemoglobin A1c; Future  -     Hemoglobin A1c    8. Polycythemia  Assessment & Plan:  Improved with last labs.  We will plan to recheck CBC with labs today.  She does understand her smoking is likely the greatest contributing factor to polycythemia and declines other workup with hematology    Orders:  -     CBC & Differential; Future  -     CBC & Differential    9. Screening mammogram for breast cancer  -     Mammo Screening Digital Tomosynthesis Bilateral With CAD;  Future    10. Personal history of tobacco use  -      CT Chest Low Dose Cancer Screening WO; Future    11. Encounter for screening for lung cancer  -      CT Chest Low Dose Cancer Screening WO; Future    12. Encounter for osteoporosis screening in asymptomatic postmenopausal patient  -     DEXA Bone Density Axial; Future    13. Seasonal allergic rhinitis due to pollen  -     fluticasone (Flonase Allergy Relief) 50 MCG/ACT nasal spray; Administer 2 sprays into the nostril(s) as directed by provider Daily.  Dispense: 48 g; Refill: 2    14. Screening for colon cancer  Assessment & Plan:  Declines colonoscopy    Past positive cologuard 9/2021 reviewed - she continues to decline further workup and understands risks and concerns for colon cancer.     FIT testing report from September 2024 through her insurance returned negative.      15. Nasal congestion  Assessment & Plan:  Doing well with Flonase.  Refilled      16. Overweight (BMI 25.0-29.9)  Assessment & Plan:  Patient's (Body mass index is 25.75 kg/m².) indicates that they are overweight with health conditions that include hypertension and dyslipidemias . Weight is unchanged. BMI is is above average; BMI management plan is completed. We discussed portion control and increasing exercise.              Follow Up   Return in about 6 months (around 5/12/2025) for Med recheck.  Patient was given instructions and counseling regarding her condition or for health maintenance advice. Please see specific information pulled into the AVS if appropriate.

## 2024-11-12 NOTE — ASSESSMENT & PLAN NOTE
We did review her home blood pressure readings and her blood pressure was running higher today initially.      She does have [facility] to start if her home blood pressure readings are staying over 140/90.

## 2024-11-12 NOTE — ASSESSMENT & PLAN NOTE
Discussed together health maintenance and screening along with vaccination options and healthy diet and exercise habits as part of the preventative counseling at their physical exam today.     She voiced understanding and is being set up for mammogram, DEXA and low-dose lung CT.    Declines all vaccination update.    Smoking cessation advised-declines.    Declines lipid treatment.

## 2024-11-12 NOTE — ASSESSMENT & PLAN NOTE
Improved with last labs.  We will plan to recheck CBC with labs today.  She does understand her smoking is likely the greatest contributing factor to polycythemia and declines other workup with hematology

## 2024-11-12 NOTE — ASSESSMENT & PLAN NOTE
Lipid abnormalities are  unchanged    Plan:  Lipid lowering therapy not prescribed due to previous adverse reaction    Counseled patient on lifestyle modifications to help control hyperlipidemia.     Failed treatment with Crestor and atorvastatin.  She also had side effects with Zetia.  Discussed newer injectable options.  She voiced understanding and declines all lipid treatment.    Discussed risk with untreated hyperlipidemia including cardiovascular risk, peripheral arterial disease risk, chronic kidney disease risk, and stroke risk.  She voiced understanding and declines lipid treatment

## 2024-11-12 NOTE — ASSESSMENT & PLAN NOTE
Patient's (Body mass index is 25.75 kg/m².) indicates that they are overweight with health conditions that include hypertension and dyslipidemias . Weight is unchanged. BMI is is above average; BMI management plan is completed. We discussed portion control and increasing exercise.

## 2024-11-13 LAB
25(OH)D3+25(OH)D2 SERPL-MCNC: 24.9 NG/ML (ref 30–100)
ALBUMIN SERPL-MCNC: 4.5 G/DL (ref 3.8–4.8)
ALP SERPL-CCNC: 93 IU/L (ref 44–121)
ALT SERPL-CCNC: 20 IU/L (ref 0–32)
AST SERPL-CCNC: 24 IU/L (ref 0–40)
BASOPHILS # BLD AUTO: 0.1 X10E3/UL (ref 0–0.2)
BASOPHILS NFR BLD AUTO: 2 %
BILIRUB SERPL-MCNC: 0.5 MG/DL (ref 0–1.2)
BUN SERPL-MCNC: 13 MG/DL (ref 8–27)
BUN/CREAT SERPL: 15 (ref 12–28)
CALCIUM SERPL-MCNC: 9.7 MG/DL (ref 8.7–10.3)
CHLORIDE SERPL-SCNC: 106 MMOL/L (ref 96–106)
CHOLEST SERPL-MCNC: 285 MG/DL (ref 100–199)
CO2 SERPL-SCNC: 21 MMOL/L (ref 20–29)
CREAT SERPL-MCNC: 0.87 MG/DL (ref 0.57–1)
EGFRCR SERPLBLD CKD-EPI 2021: 71 ML/MIN/1.73
EOSINOPHIL # BLD AUTO: 0 X10E3/UL (ref 0–0.4)
EOSINOPHIL NFR BLD AUTO: 1 %
ERYTHROCYTE [DISTWIDTH] IN BLOOD BY AUTOMATED COUNT: 13.5 % (ref 11.7–15.4)
GLOBULIN SER CALC-MCNC: 2.9 G/DL (ref 1.5–4.5)
GLUCOSE SERPL-MCNC: 91 MG/DL (ref 70–99)
HBA1C MFR BLD: 5.6 % (ref 4.8–5.6)
HCT VFR BLD AUTO: 47.4 % (ref 34–46.6)
HDLC SERPL-MCNC: 68 MG/DL
HGB BLD-MCNC: 16.3 G/DL (ref 11.1–15.9)
IMM GRANULOCYTES # BLD AUTO: 0 X10E3/UL (ref 0–0.1)
IMM GRANULOCYTES NFR BLD AUTO: 0 %
LDL CALC COMMENT:: ABNORMAL
LDLC SERPL CALC-MCNC: 198 MG/DL (ref 0–99)
LYMPHOCYTES # BLD AUTO: 1.5 X10E3/UL (ref 0.7–3.1)
LYMPHOCYTES NFR BLD AUTO: 23 %
MCH RBC QN AUTO: 32 PG (ref 26.6–33)
MCHC RBC AUTO-ENTMCNC: 34.4 G/DL (ref 31.5–35.7)
MCV RBC AUTO: 93 FL (ref 79–97)
MONOCYTES # BLD AUTO: 0.4 X10E3/UL (ref 0.1–0.9)
MONOCYTES NFR BLD AUTO: 7 %
NEUTROPHILS # BLD AUTO: 4.4 X10E3/UL (ref 1.4–7)
NEUTROPHILS NFR BLD AUTO: 67 %
PLATELET # BLD AUTO: 182 X10E3/UL (ref 150–450)
POTASSIUM SERPL-SCNC: 4.3 MMOL/L (ref 3.5–5.2)
PROT SERPL-MCNC: 7.4 G/DL (ref 6–8.5)
RBC # BLD AUTO: 5.1 X10E6/UL (ref 3.77–5.28)
SODIUM SERPL-SCNC: 143 MMOL/L (ref 134–144)
T4 FREE SERPL-MCNC: 1.53 NG/DL (ref 0.82–1.77)
TRIGL SERPL-MCNC: 109 MG/DL (ref 0–149)
TSH SERPL DL<=0.005 MIU/L-ACNC: 10.6 UIU/ML (ref 0.45–4.5)
VLDLC SERPL CALC-MCNC: 19 MG/DL (ref 5–40)
WBC # BLD AUTO: 6.5 X10E3/UL (ref 3.4–10.8)

## 2024-11-15 ENCOUNTER — TELEPHONE (OUTPATIENT)
Dept: FAMILY MEDICINE CLINIC | Facility: CLINIC | Age: 74
End: 2024-11-15
Payer: MEDICARE

## 2024-11-15 DIAGNOSIS — E03.9 ACQUIRED HYPOTHYROIDISM: Chronic | ICD-10-CM

## 2024-11-15 RX ORDER — LEVOTHYROXINE SODIUM 88 UG/1
88 TABLET ORAL
Qty: 90 TABLET | Refills: 2 | Status: SHIPPED | OUTPATIENT
Start: 2024-11-15

## 2024-11-15 NOTE — TELEPHONE ENCOUNTER
Ok - please let her know that I increased her up to the 88mcg dose given she has not missed any doses. Thanks

## 2024-11-15 NOTE — TELEPHONE ENCOUNTER
Patient states she read message through [employer] on her labs. States she has not missed and doses recently of her thyroid medication. Concerned she needs a dosage adjustment.

## 2025-05-13 ENCOUNTER — OFFICE VISIT (OUTPATIENT)
Dept: FAMILY MEDICINE CLINIC | Facility: CLINIC | Age: 75
End: 2025-05-13
Payer: MEDICARE

## 2025-05-13 VITALS
WEIGHT: 148.5 LBS | DIASTOLIC BLOOD PRESSURE: 82 MMHG | BODY MASS INDEX: 25.35 KG/M2 | SYSTOLIC BLOOD PRESSURE: 140 MMHG | HEART RATE: 95 BPM | OXYGEN SATURATION: 95 % | HEIGHT: 64 IN

## 2025-05-13 DIAGNOSIS — R73.9 HYPERGLYCEMIA: ICD-10-CM

## 2025-05-13 DIAGNOSIS — M81.0 AGE-RELATED OSTEOPOROSIS WITHOUT CURRENT PATHOLOGICAL FRACTURE: Chronic | ICD-10-CM

## 2025-05-13 DIAGNOSIS — Z12.11 SCREENING FOR COLON CANCER: ICD-10-CM

## 2025-05-13 DIAGNOSIS — E03.9 ACQUIRED HYPOTHYROIDISM: Chronic | ICD-10-CM

## 2025-05-13 DIAGNOSIS — E55.9 VITAMIN D DEFICIENCY: ICD-10-CM

## 2025-05-13 DIAGNOSIS — E78.2 HYPERLIPIDEMIA, MIXED: Chronic | ICD-10-CM

## 2025-05-13 DIAGNOSIS — I10 HYPERTENSION, ESSENTIAL: Primary | ICD-10-CM

## 2025-05-13 DIAGNOSIS — J30.1 SEASONAL ALLERGIC RHINITIS DUE TO POLLEN: ICD-10-CM

## 2025-05-13 DIAGNOSIS — E66.3 OVERWEIGHT (BMI 25.0-29.9): ICD-10-CM

## 2025-05-13 DIAGNOSIS — D75.1 POLYCYTHEMIA: Chronic | ICD-10-CM

## 2025-05-13 RX ORDER — FLUTICASONE PROPIONATE 50 MCG
2 SPRAY, SUSPENSION (ML) NASAL DAILY
Qty: 48 G | Refills: 2 | Status: SHIPPED | OUTPATIENT
Start: 2025-05-13

## 2025-05-13 RX ORDER — LEVOTHYROXINE SODIUM 88 UG/1
88 TABLET ORAL
Qty: 90 TABLET | Refills: 2 | Status: SHIPPED | OUTPATIENT
Start: 2025-05-13

## 2025-05-13 RX ORDER — AMLODIPINE BESYLATE 2.5 MG/1
2.5 TABLET ORAL DAILY
Qty: 90 TABLET | Refills: 2 | Status: SHIPPED | OUTPATIENT
Start: 2025-05-13

## 2025-05-13 NOTE — ASSESSMENT & PLAN NOTE
Discussed BP at home and here    Willing to try low-dose norvasc    Encouraged home BP checks    Rx given

## 2025-05-13 NOTE — ASSESSMENT & PLAN NOTE
Discussed osteoporosis and osteopenia with her most recent bone density evaluation in Feb 2025.    She voiced understanding but declines medications for osteoporosis.      She is willing to get a check on her vitamin D with labs today.

## 2025-05-13 NOTE — PROGRESS NOTES
Chief Complaint  Hyperlipidemia, allergies, hypothyroidism, vitamin D deficiency, osteoporosis, elevated blood pressure    Subjective    History of Present Illness:  Latia Liz is a 74 y.o. female who presents today for 6 month followup visit for medication refills     She continued with monitoring blood pressure readings at home given problems with past whitecoat hypertension.  Does admit to episodes weekly with stress/spiking and would like to try low-dose norvasc at the lowest 2.5mg dosing.     She did try Zetia for hyperlipidemia along with cardiovascular and stroke risk reduction given her history of hyperlipidemia and longstanding history of smoking.  She did have GI side effects with Zetia so decided to stop Zetia treatment.  She also failed Crestor and atorvastatin in the past.  We did discuss injectable cholesterol medication options again today and she voiced understanding but declines treatment for hyperlipidemia given past side effects with statins and Zetia.      She does understand increased risk for heart disease, kidney disease, peripheral vascular disease, and stroke with untreated hyperlipidemia.    Doing well with synthroid for hypothyroidism at 88mcg dosing adjusted last visit based on labs.     Continues with vit D given history of vit D deficiency and osteoporosis.  She declines treatment for osteoporosis.    She continues to smoke and is not ready to quit.  She did fail nicotine replacement in the past given nightmares.  81 pk-yrs.  Low-dose lung CT up-to-date December 2024    Declines all vaccination updates-reviewed flu, Prevnar 20, Tdap, RSV, Shingrix, and COVID today.     Declines colonoscopy.  Cologuard pos 9/2021 with [facility].  No bleeding or wt loss or stool changes.  Declines colonoscopy even in context of pos cologuard.  She did complete a negative fit test with her insurance September 2024.  Discussed limitations with FIT testing.  She voiced understanding.   "Understands concerns for colon cancer.      Mammogram up-to-date 25 DEXA did again show osteoporosis.  She declines osteoporosis treatment but does continue with vit D.      Mild polycythemia with past labs.  We discussed further work-up options but we discussed this is likely related to her smoking.  She voiced understanding but wants to wait on further work-up at this time.    Recurrent problems with nasal congestion and sinusitis improved with Flonase.    Objective   Vital Signs:   /82   Pulse 95   Ht 162.6 cm (64\")   Wt 67.4 kg (148 lb 8 oz)   SpO2 95%   BMI 25.49 kg/m²     Review of Systems   Constitutional:  Negative for appetite change, chills, diaphoresis, fatigue and fever.   HENT:  Positive for congestion. Negative for hearing loss, sore throat and swollen glands.    Eyes:  Negative for blurred vision.   Respiratory:  Negative for cough and chest tightness.    Cardiovascular:  Negative for chest pain.   Gastrointestinal:  Negative for abdominal pain, nausea and vomiting.   Genitourinary:  Negative for dysuria.   Musculoskeletal:  Negative for gait problem, myalgias and neck pain.   Skin:  Negative for rash.   Neurological:  Negative for weakness and numbness.   Psychiatric/Behavioral:  Negative for depressed mood.        Past History:  Medical History: has a past medical history of Hypercholesterolemia, Hypothyroidism (), Infectious viral hepatitis, and Thyroiditis.   Surgical History: has a past surgical history that includes  section (612518) and Eye surgery (337922).   Family History: family history includes Heart disease in her maternal grandfather.   Social History: reports that she has been smoking cigarettes. She started smoking about 44 years ago. She has a 88.7 pack-year smoking history. She has been exposed to tobacco smoke. She has never used smokeless tobacco. She reports that she does not drink alcohol and does not use drugs.      Current Outpatient " Medications:     fluticasone (Flonase Allergy Relief) 50 MCG/ACT nasal spray, Administer 2 sprays into the nostril(s) as directed by provider Daily., Disp: 48 g, Rfl: 2    levothyroxine (Synthroid) 88 MCG tablet, Take 1 tablet by mouth Every Morning. (Take on empty stomach with water only, no other medications or food for 30 mins)., Disp: 90 tablet, Rfl: 2    vitamin D3 125 MCG (5000 UT) capsule capsule, Take 1 capsule by mouth Daily., Disp: 90 capsule, Rfl: 1    amLODIPine (NORVASC) 2.5 MG tablet, Take 1 tablet by mouth Daily. For blood pressure, Disp: 90 tablet, Rfl: 2    Allergies: Crestor [rosuvastatin] and Zetia [ezetimibe]    Physical Exam  Constitutional:       Appearance: Normal appearance.   HENT:      Head: Normocephalic.      Right Ear: External ear normal.      Left Ear: External ear normal.      Nose: Nose normal.      Mouth/Throat:      Mouth: Mucous membranes are moist.      Pharynx: Oropharynx is clear.   Eyes:      Pupils: Pupils are equal, round, and reactive to light.   Cardiovascular:      Rate and Rhythm: Normal rate and regular rhythm.      Heart sounds: Normal heart sounds. No murmur heard.     No friction rub. No gallop.   Pulmonary:      Effort: Pulmonary effort is normal.      Breath sounds: Normal breath sounds.   Musculoskeletal:         General: Normal range of motion.      Cervical back: Normal range of motion.   Skin:     General: Skin is warm and dry.   Neurological:      General: No focal deficit present.      Mental Status: She is alert.   Psychiatric:         Mood and Affect: Mood normal.         Behavior: Behavior normal.         Thought Content: Thought content normal.          Result Review                   Assessment and Plan  Diagnoses and all orders for this visit:    1. Hypertension, essential (Primary)  Assessment & Plan:  Discussed BP at home and here    Willing to try low-dose norvasc    Encouraged home BP checks    Rx given     Orders:  -     amLODIPine (NORVASC) 2.5 MG  tablet; Take 1 tablet by mouth Daily. For blood pressure  Dispense: 90 tablet; Refill: 2  -     CBC & Differential; Future  -     Comprehensive Metabolic Panel; Future  -     Lipid Panel; Future  -     TSH; Future  -     T4, Free; Future  -     T4, Free  -     TSH  -     Lipid Panel  -     Comprehensive Metabolic Panel  -     CBC & Differential    2. Hyperlipidemia, mixed  Assessment & Plan:   Lipid abnormalities are  unchanged    Plan:  Lipid lowering therapy not prescribed due to previous adverse reaction    Counseled patient on lifestyle modifications to help control hyperlipidemia.     Failed treatment with Crestor and atorvastatin.  She also had side effects with Zetia.  Discussed newer injectable options.  She voiced understanding and declines all lipid treatment.    Discussed risk with untreated hyperlipidemia including cardiovascular risk, peripheral arterial disease risk, chronic kidney disease risk, and stroke risk.  She voiced understanding and declines lipid treatment    Orders:  -     CBC & Differential; Future  -     Comprehensive Metabolic Panel; Future  -     Lipid Panel; Future  -     TSH; Future  -     T4, Free; Future  -     T4, Free  -     TSH  -     Lipid Panel  -     Comprehensive Metabolic Panel  -     CBC & Differential    3. Acquired hypothyroidism  Assessment & Plan:  Continue Synthroid    Orders:  -     levothyroxine (Synthroid) 88 MCG tablet; Take 1 tablet by mouth Every Morning. (Take on empty stomach with water only, no other medications or food for 30 mins).  Dispense: 90 tablet; Refill: 2  -     CBC & Differential; Future  -     Comprehensive Metabolic Panel; Future  -     Lipid Panel; Future  -     TSH; Future  -     T4, Free; Future  -     T4, Free  -     TSH  -     Lipid Panel  -     Comprehensive Metabolic Panel  -     CBC & Differential    4. Age-related osteoporosis without current pathological fracture  Assessment & Plan:  Discussed osteoporosis and osteopenia with her most  recent bone density evaluation in Feb 2025.    She voiced understanding but declines medications for osteoporosis.      She is willing to get a check on her vitamin D with labs today.    Orders:  -     Vitamin D,25-Hydroxy; Future  -     Vitamin D,25-Hydroxy    5. Polycythemia  Assessment & Plan:  Stable with last labs.  We will plan to recheck CBC with labs today.  She does understand her smoking is likely the greatest contributing factor to polycythemia and declines other workup with hematology      6. Screening for colon cancer  Assessment & Plan:  Declines colonoscopy    Past positive cologuard 9/2021 reviewed - she continues to decline further workup and understands risks and concerns for colon cancer.     FIT testing report from September 2024 through her insurance returned negative.      7. Overweight (BMI 25.0-29.9)  Assessment & Plan:  Patient's (Body mass index is 25.49 kg/m².) indicates that they are overweight with health conditions that include hypertension and dyslipidemias . Weight is unchanged. BMI is is above average; BMI management plan is completed. We discussed portion control and increasing exercise.       8. Seasonal allergic rhinitis due to pollen  -     fluticasone (Flonase Allergy Relief) 50 MCG/ACT nasal spray; Administer 2 sprays into the nostril(s) as directed by provider Daily.  Dispense: 48 g; Refill: 2    9. Vitamin D deficiency  -     Vitamin D,25-Hydroxy; Future  -     Vitamin D,25-Hydroxy    10. Hyperglycemia  -     Hemoglobin A1c; Future  -     Hemoglobin A1c                   Follow Up  Return in 6 months (on 11/13/2025) for Medicare Wellness.    Osmin Mays MD

## 2025-05-13 NOTE — ASSESSMENT & PLAN NOTE
Patient's (Body mass index is 25.49 kg/m².) indicates that they are overweight with health conditions that include hypertension and dyslipidemias . Weight is unchanged. BMI is is above average; BMI management plan is completed. We discussed portion control and increasing exercise.

## 2025-05-13 NOTE — ASSESSMENT & PLAN NOTE
Stable with last labs.  We will plan to recheck CBC with labs today.  She does understand her smoking is likely the greatest contributing factor to polycythemia and declines other workup with hematology

## 2025-05-14 LAB
25(OH)D3+25(OH)D2 SERPL-MCNC: 26.4 NG/ML (ref 30–100)
ALBUMIN SERPL-MCNC: 4.4 G/DL (ref 3.8–4.8)
ALP SERPL-CCNC: 102 IU/L (ref 44–121)
ALT SERPL-CCNC: 11 IU/L (ref 0–32)
AST SERPL-CCNC: 18 IU/L (ref 0–40)
BASOPHILS # BLD AUTO: 0.1 X10E3/UL (ref 0–0.2)
BASOPHILS NFR BLD AUTO: 1 %
BILIRUB SERPL-MCNC: 0.3 MG/DL (ref 0–1.2)
BUN SERPL-MCNC: 8 MG/DL (ref 8–27)
BUN/CREAT SERPL: 10 (ref 12–28)
CALCIUM SERPL-MCNC: 9.7 MG/DL (ref 8.7–10.3)
CHLORIDE SERPL-SCNC: 105 MMOL/L (ref 96–106)
CHOLEST SERPL-MCNC: 257 MG/DL (ref 100–199)
CO2 SERPL-SCNC: 22 MMOL/L (ref 20–29)
CREAT SERPL-MCNC: 0.84 MG/DL (ref 0.57–1)
EGFRCR SERPLBLD CKD-EPI 2021: 74 ML/MIN/1.73
EOSINOPHIL # BLD AUTO: 0 X10E3/UL (ref 0–0.4)
EOSINOPHIL NFR BLD AUTO: 0 %
ERYTHROCYTE [DISTWIDTH] IN BLOOD BY AUTOMATED COUNT: 13 % (ref 11.7–15.4)
GLOBULIN SER CALC-MCNC: 2.6 G/DL (ref 1.5–4.5)
GLUCOSE SERPL-MCNC: 94 MG/DL (ref 70–99)
HBA1C MFR BLD: 5.5 % (ref 4.8–5.6)
HCT VFR BLD AUTO: 48.4 % (ref 34–46.6)
HDLC SERPL-MCNC: 58 MG/DL
HGB BLD-MCNC: 16 G/DL (ref 11.1–15.9)
IMM GRANULOCYTES # BLD AUTO: 0 X10E3/UL (ref 0–0.1)
IMM GRANULOCYTES NFR BLD AUTO: 0 %
LDLC SERPL CALC-MCNC: 185 MG/DL (ref 0–99)
LYMPHOCYTES # BLD AUTO: 1.7 X10E3/UL (ref 0.7–3.1)
LYMPHOCYTES NFR BLD AUTO: 24 %
MCH RBC QN AUTO: 31.3 PG (ref 26.6–33)
MCHC RBC AUTO-ENTMCNC: 33.1 G/DL (ref 31.5–35.7)
MCV RBC AUTO: 95 FL (ref 79–97)
MONOCYTES # BLD AUTO: 0.6 X10E3/UL (ref 0.1–0.9)
MONOCYTES NFR BLD AUTO: 7 %
NEUTROPHILS # BLD AUTO: 5 X10E3/UL (ref 1.4–7)
NEUTROPHILS NFR BLD AUTO: 68 %
PLATELET # BLD AUTO: 206 X10E3/UL (ref 150–450)
POTASSIUM SERPL-SCNC: 4.3 MMOL/L (ref 3.5–5.2)
PROT SERPL-MCNC: 7 G/DL (ref 6–8.5)
RBC # BLD AUTO: 5.11 X10E6/UL (ref 3.77–5.28)
SODIUM SERPL-SCNC: 141 MMOL/L (ref 134–144)
T4 FREE SERPL-MCNC: 1.83 NG/DL (ref 0.82–1.77)
TRIGL SERPL-MCNC: 84 MG/DL (ref 0–149)
TSH SERPL DL<=0.005 MIU/L-ACNC: 2.84 UIU/ML (ref 0.45–4.5)
VLDLC SERPL CALC-MCNC: 14 MG/DL (ref 5–40)
WBC # BLD AUTO: 7.4 X10E3/UL (ref 3.4–10.8)

## 2025-08-18 ENCOUNTER — OFFICE VISIT (OUTPATIENT)
Dept: FAMILY MEDICINE CLINIC | Facility: CLINIC | Age: 75
End: 2025-08-18
Payer: MEDICARE

## 2025-08-18 VITALS
HEART RATE: 108 BPM | WEIGHT: 140 LBS | HEIGHT: 64 IN | BODY MASS INDEX: 23.9 KG/M2 | OXYGEN SATURATION: 89 % | SYSTOLIC BLOOD PRESSURE: 154 MMHG | DIASTOLIC BLOOD PRESSURE: 90 MMHG

## 2025-08-18 DIAGNOSIS — J20.9 ACUTE BRONCHITIS WITH BRONCHOSPASM: ICD-10-CM

## 2025-08-18 DIAGNOSIS — J18.9 PNEUMONIA OF BOTH LUNGS DUE TO INFECTIOUS ORGANISM, UNSPECIFIED PART OF LUNG: Primary | ICD-10-CM

## 2025-08-18 PROCEDURE — 3080F DIAST BP >= 90 MM HG: CPT | Performed by: PHYSICIAN ASSISTANT

## 2025-08-18 PROCEDURE — 99214 OFFICE O/P EST MOD 30 MIN: CPT | Performed by: PHYSICIAN ASSISTANT

## 2025-08-18 PROCEDURE — 96372 THER/PROPH/DIAG INJ SC/IM: CPT | Performed by: PHYSICIAN ASSISTANT

## 2025-08-18 PROCEDURE — 1126F AMNT PAIN NOTED NONE PRSNT: CPT | Performed by: PHYSICIAN ASSISTANT

## 2025-08-18 PROCEDURE — 1159F MED LIST DOCD IN RCRD: CPT | Performed by: PHYSICIAN ASSISTANT

## 2025-08-18 PROCEDURE — 3077F SYST BP >= 140 MM HG: CPT | Performed by: PHYSICIAN ASSISTANT

## 2025-08-18 PROCEDURE — 1160F RVW MEDS BY RX/DR IN RCRD: CPT | Performed by: PHYSICIAN ASSISTANT

## 2025-08-18 RX ORDER — CEFTRIAXONE 1 G/1
1 INJECTION, POWDER, FOR SOLUTION INTRAMUSCULAR; INTRAVENOUS EVERY 24 HOURS
Status: COMPLETED | OUTPATIENT
Start: 2025-08-18 | End: 2025-08-18

## 2025-08-18 RX ORDER — PREDNISONE 20 MG/1
TABLET ORAL
Qty: 20 TABLET | Refills: 0 | Status: SHIPPED | OUTPATIENT
Start: 2025-08-18 | End: 2025-08-29

## 2025-08-18 RX ORDER — TRIAMCINOLONE ACETONIDE 40 MG/ML
80 INJECTION, SUSPENSION INTRA-ARTICULAR; INTRAMUSCULAR ONCE
Status: COMPLETED | OUTPATIENT
Start: 2025-08-18 | End: 2025-08-18

## 2025-08-18 RX ORDER — ALBUTEROL SULFATE 90 UG/1
2 INHALANT RESPIRATORY (INHALATION)
COMMUNITY
Start: 2025-08-11

## 2025-08-18 RX ORDER — DOXYCYCLINE 100 MG/1
1 TABLET ORAL EVERY 12 HOURS SCHEDULED
COMMUNITY
Start: 2025-08-11 | End: 2025-08-18

## 2025-08-18 RX ORDER — LEVOFLOXACIN 500 MG/1
500 TABLET, FILM COATED ORAL DAILY
Qty: 10 TABLET | Refills: 1 | Status: SHIPPED | OUTPATIENT
Start: 2025-08-18

## 2025-08-18 RX ADMIN — CEFTRIAXONE 1 G: 1 INJECTION, POWDER, FOR SOLUTION INTRAMUSCULAR; INTRAVENOUS at 11:52

## 2025-08-18 RX ADMIN — TRIAMCINOLONE ACETONIDE 80 MG: 40 INJECTION, SUSPENSION INTRA-ARTICULAR; INTRAMUSCULAR at 11:53
